# Patient Record
Sex: MALE | Race: WHITE | Employment: OTHER | URBAN - METROPOLITAN AREA
[De-identification: names, ages, dates, MRNs, and addresses within clinical notes are randomized per-mention and may not be internally consistent; named-entity substitution may affect disease eponyms.]

---

## 2017-02-27 ENCOUNTER — OFFICE VISIT (OUTPATIENT)
Dept: FAMILY MEDICINE CLINIC | Age: 82
End: 2017-02-27

## 2017-02-27 VITALS
OXYGEN SATURATION: 98 % | HEART RATE: 86 BPM | DIASTOLIC BLOOD PRESSURE: 96 MMHG | TEMPERATURE: 99.9 F | WEIGHT: 173 LBS | BODY MASS INDEX: 26.22 KG/M2 | RESPIRATION RATE: 18 BRPM | SYSTOLIC BLOOD PRESSURE: 158 MMHG | HEIGHT: 68 IN

## 2017-02-27 DIAGNOSIS — J40 BRONCHITIS: Primary | ICD-10-CM

## 2017-02-27 PROCEDURE — G8427 DOCREV CUR MEDS BY ELIG CLIN: HCPCS | Performed by: FAMILY MEDICINE

## 2017-02-27 PROCEDURE — 99213 OFFICE O/P EST LOW 20 MIN: CPT | Performed by: FAMILY MEDICINE

## 2017-02-27 PROCEDURE — 4040F PNEUMOC VAC/ADMIN/RCVD: CPT | Performed by: FAMILY MEDICINE

## 2017-02-27 PROCEDURE — G8484 FLU IMMUNIZE NO ADMIN: HCPCS | Performed by: FAMILY MEDICINE

## 2017-02-27 PROCEDURE — 1123F ACP DISCUSS/DSCN MKR DOCD: CPT | Performed by: FAMILY MEDICINE

## 2017-02-27 PROCEDURE — 1036F TOBACCO NON-USER: CPT | Performed by: FAMILY MEDICINE

## 2017-02-27 PROCEDURE — G8420 CALC BMI NORM PARAMETERS: HCPCS | Performed by: FAMILY MEDICINE

## 2017-02-27 RX ORDER — PROMETHAZINE HYDROCHLORIDE AND CODEINE PHOSPHATE 6.25; 1 MG/5ML; MG/5ML
5 SYRUP ORAL EVERY 4 HOURS PRN
Qty: 240 ML | Refills: 1 | Status: SHIPPED | OUTPATIENT
Start: 2017-02-27 | End: 2018-02-27

## 2017-02-27 RX ORDER — CEFDINIR 300 MG/1
300 CAPSULE ORAL 2 TIMES DAILY
Qty: 20 CAPSULE | Refills: 0 | Status: SHIPPED | OUTPATIENT
Start: 2017-02-27 | End: 2017-03-09

## 2017-02-27 ASSESSMENT — ENCOUNTER SYMPTOMS
GASTROINTESTINAL NEGATIVE: 1
EYES NEGATIVE: 1
COUGH: 1
SHORTNESS OF BREATH: 0
ALLERGIC/IMMUNOLOGIC NEGATIVE: 1

## 2017-03-23 RX ORDER — TAMSULOSIN HYDROCHLORIDE 0.4 MG/1
CAPSULE ORAL
Qty: 90 CAPSULE | Refills: 3 | Status: SHIPPED | OUTPATIENT
Start: 2017-03-23 | End: 2018-03-12 | Stop reason: SDUPTHER

## 2017-11-16 RX ORDER — FINASTERIDE 5 MG/1
5 TABLET, FILM COATED ORAL DAILY
Qty: 90 TABLET | Refills: 3 | Status: SHIPPED | OUTPATIENT
Start: 2017-11-16 | End: 2019-10-18 | Stop reason: ALTCHOICE

## 2018-03-12 RX ORDER — TAMSULOSIN HYDROCHLORIDE 0.4 MG/1
CAPSULE ORAL
Qty: 90 CAPSULE | Refills: 3 | Status: SHIPPED | OUTPATIENT
Start: 2018-03-12 | End: 2019-10-18 | Stop reason: ALTCHOICE

## 2018-11-23 RX ORDER — FINASTERIDE 5 MG/1
5 TABLET, FILM COATED ORAL DAILY
Qty: 90 TABLET | Refills: 3 | Status: SHIPPED | OUTPATIENT
Start: 2018-11-23 | End: 2019-10-18

## 2019-03-21 RX ORDER — TAMSULOSIN HYDROCHLORIDE 0.4 MG/1
0.4 CAPSULE ORAL DAILY
Qty: 90 CAPSULE | Refills: 3 | Status: SHIPPED | OUTPATIENT
Start: 2019-03-21 | End: 2019-10-18 | Stop reason: ALTCHOICE

## 2019-10-18 ENCOUNTER — OFFICE VISIT (OUTPATIENT)
Dept: FAMILY MEDICINE CLINIC | Age: 84
End: 2019-10-18
Payer: MEDICARE

## 2019-10-18 VITALS
DIASTOLIC BLOOD PRESSURE: 86 MMHG | BODY MASS INDEX: 27.4 KG/M2 | HEIGHT: 69 IN | RESPIRATION RATE: 14 BRPM | SYSTOLIC BLOOD PRESSURE: 136 MMHG | OXYGEN SATURATION: 97 % | WEIGHT: 185 LBS | TEMPERATURE: 97.6 F | HEART RATE: 64 BPM

## 2019-10-18 DIAGNOSIS — R39.12 BENIGN PROSTATIC HYPERPLASIA WITH WEAK URINARY STREAM: ICD-10-CM

## 2019-10-18 DIAGNOSIS — Z00.00 PHYSICAL EXAM, ANNUAL: ICD-10-CM

## 2019-10-18 DIAGNOSIS — Z00.00 PHYSICAL EXAM, ANNUAL: Primary | ICD-10-CM

## 2019-10-18 DIAGNOSIS — N40.1 BENIGN PROSTATIC HYPERPLASIA WITH WEAK URINARY STREAM: ICD-10-CM

## 2019-10-18 LAB
ALBUMIN SERPL-MCNC: 4.2 G/DL (ref 3.5–4.6)
ALP BLD-CCNC: 131 U/L (ref 35–104)
ALT SERPL-CCNC: 19 U/L (ref 0–41)
ANION GAP SERPL CALCULATED.3IONS-SCNC: 13 MEQ/L (ref 9–15)
AST SERPL-CCNC: 22 U/L (ref 0–40)
BASOPHILS ABSOLUTE: 0 K/UL (ref 0–0.2)
BASOPHILS RELATIVE PERCENT: 0.4 %
BILIRUB SERPL-MCNC: 0.5 MG/DL (ref 0.2–0.7)
BUN BLDV-MCNC: 12 MG/DL (ref 8–23)
CALCIUM SERPL-MCNC: 9.3 MG/DL (ref 8.5–9.9)
CHLORIDE BLD-SCNC: 99 MEQ/L (ref 95–107)
CHOLESTEROL, TOTAL: 171 MG/DL (ref 0–199)
CO2: 29 MEQ/L (ref 20–31)
CREAT SERPL-MCNC: 0.74 MG/DL (ref 0.7–1.2)
EOSINOPHILS ABSOLUTE: 1.1 K/UL (ref 0–0.7)
EOSINOPHILS RELATIVE PERCENT: 16.1 %
GFR AFRICAN AMERICAN: >60
GFR NON-AFRICAN AMERICAN: >60
GLOBULIN: 3.5 G/DL (ref 2.3–3.5)
GLUCOSE BLD-MCNC: 114 MG/DL (ref 70–99)
HCT VFR BLD CALC: 48.2 % (ref 42–52)
HDLC SERPL-MCNC: 50 MG/DL (ref 40–59)
HEMOGLOBIN: 15.3 G/DL (ref 14–18)
LDL CHOLESTEROL CALCULATED: 107 MG/DL (ref 0–129)
LYMPHOCYTES ABSOLUTE: 1.4 K/UL (ref 1–4.8)
LYMPHOCYTES RELATIVE PERCENT: 19.9 %
MCH RBC QN AUTO: 29.2 PG (ref 27–31.3)
MCHC RBC AUTO-ENTMCNC: 31.7 % (ref 33–37)
MCV RBC AUTO: 92.3 FL (ref 80–100)
MONOCYTES ABSOLUTE: 0.8 K/UL (ref 0.2–0.8)
MONOCYTES RELATIVE PERCENT: 11.1 %
NEUTROPHILS ABSOLUTE: 3.7 K/UL (ref 1.4–6.5)
NEUTROPHILS RELATIVE PERCENT: 52.5 %
PDW BLD-RTO: 14.8 % (ref 11.5–14.5)
PLATELET # BLD: 186 K/UL (ref 130–400)
POTASSIUM SERPL-SCNC: 4.7 MEQ/L (ref 3.4–4.9)
RBC # BLD: 5.22 M/UL (ref 4.7–6.1)
SODIUM BLD-SCNC: 141 MEQ/L (ref 135–144)
TOTAL PROTEIN: 7.7 G/DL (ref 6.3–8)
TRIGL SERPL-MCNC: 71 MG/DL (ref 0–150)
WBC # BLD: 7 K/UL (ref 4.8–10.8)

## 2019-10-18 PROCEDURE — G0009 ADMIN PNEUMOCOCCAL VACCINE: HCPCS | Performed by: FAMILY MEDICINE

## 2019-10-18 PROCEDURE — G8484 FLU IMMUNIZE NO ADMIN: HCPCS | Performed by: FAMILY MEDICINE

## 2019-10-18 PROCEDURE — G0438 PPPS, INITIAL VISIT: HCPCS | Performed by: FAMILY MEDICINE

## 2019-10-18 PROCEDURE — 90732 PPSV23 VACC 2 YRS+ SUBQ/IM: CPT | Performed by: FAMILY MEDICINE

## 2019-10-18 RX ORDER — TAMSULOSIN HYDROCHLORIDE 0.4 MG/1
0.4 CAPSULE ORAL DAILY
COMMUNITY
End: 2020-03-03

## 2019-10-18 RX ORDER — CALCIUM CARBONATE/VITAMIN D3 600 MG-10
TABLET ORAL
COMMUNITY

## 2019-10-18 RX ORDER — LATANOPROST 50 UG/ML
1 SOLUTION/ DROPS OPHTHALMIC NIGHTLY
COMMUNITY

## 2019-10-18 RX ORDER — HYDROCORTISONE ACETATE 0.5 %
CREAM (GRAM) TOPICAL
COMMUNITY
End: 2021-01-01

## 2019-10-18 RX ORDER — SPIRONOLACTONE 25 MG
TABLET ORAL
COMMUNITY

## 2019-10-18 RX ORDER — LORATADINE 10 MG/1
10 CAPSULE, LIQUID FILLED ORAL DAILY
COMMUNITY
End: 2021-01-01

## 2019-10-18 ASSESSMENT — ENCOUNTER SYMPTOMS
EYES NEGATIVE: 1
SHORTNESS OF BREATH: 0
ALLERGIC/IMMUNOLOGIC NEGATIVE: 1
RESPIRATORY NEGATIVE: 1
GASTROINTESTINAL NEGATIVE: 1

## 2019-10-18 ASSESSMENT — PATIENT HEALTH QUESTIONNAIRE - PHQ9
SUM OF ALL RESPONSES TO PHQ QUESTIONS 1-9: 0
1. LITTLE INTEREST OR PLEASURE IN DOING THINGS: 0
SUM OF ALL RESPONSES TO PHQ QUESTIONS 1-9: 0
2. FEELING DOWN, DEPRESSED OR HOPELESS: 0
SUM OF ALL RESPONSES TO PHQ9 QUESTIONS 1 & 2: 0

## 2019-11-18 RX ORDER — FINASTERIDE 5 MG/1
5 TABLET, FILM COATED ORAL DAILY
Qty: 90 TABLET | Refills: 3 | Status: SHIPPED | OUTPATIENT
Start: 2019-11-18 | End: 2020-01-01 | Stop reason: SDUPTHER

## 2020-01-01 ENCOUNTER — TELEPHONE (OUTPATIENT)
Dept: FAMILY MEDICINE CLINIC | Age: 85
End: 2020-01-01

## 2020-01-01 ENCOUNTER — VIRTUAL VISIT (OUTPATIENT)
Dept: FAMILY MEDICINE CLINIC | Age: 85
End: 2020-01-01
Payer: MEDICARE

## 2020-01-01 ENCOUNTER — HOSPITAL ENCOUNTER (INPATIENT)
Age: 85
LOS: 2 days | Discharge: HOME | DRG: 101 | End: 2020-12-03
Attending: STUDENT IN AN ORGANIZED HEALTH CARE EDUCATION/TRAINING PROGRAM | Admitting: FAMILY MEDICINE
Payer: MEDICARE

## 2020-01-01 ENCOUNTER — APPOINTMENT (OUTPATIENT)
Dept: CT IMAGING | Age: 85
DRG: 101 | End: 2020-01-01
Payer: MEDICARE

## 2020-01-01 VITALS
WEIGHT: 174.25 LBS | SYSTOLIC BLOOD PRESSURE: 129 MMHG | BODY MASS INDEX: 28 KG/M2 | TEMPERATURE: 97.7 F | RESPIRATION RATE: 18 BRPM | OXYGEN SATURATION: 99 % | DIASTOLIC BLOOD PRESSURE: 65 MMHG | HEIGHT: 66 IN | HEART RATE: 61 BPM

## 2020-01-01 LAB
ABO/RH: NORMAL
ALBUMIN SERPL-MCNC: 3.9 G/DL (ref 3.5–4.6)
ALP BLD-CCNC: 142 U/L (ref 35–104)
ALT SERPL-CCNC: 19 U/L (ref 0–41)
ANION GAP SERPL CALCULATED.3IONS-SCNC: 8 MEQ/L (ref 9–15)
ANION GAP SERPL CALCULATED.3IONS-SCNC: 9 MEQ/L (ref 9–15)
ANTIBODY SCREEN: NORMAL
APTT: 35.8 SEC (ref 24.4–36.8)
AST SERPL-CCNC: 20 U/L (ref 0–40)
BASOPHILS ABSOLUTE: 0 K/UL (ref 0–0.2)
BASOPHILS ABSOLUTE: 0 K/UL (ref 0–0.2)
BASOPHILS RELATIVE PERCENT: 0.3 %
BASOPHILS RELATIVE PERCENT: 0.4 %
BILIRUB SERPL-MCNC: 0.4 MG/DL (ref 0.2–0.7)
BILIRUBIN URINE: NEGATIVE
BLOOD, URINE: NEGATIVE
BUN BLDV-MCNC: 12 MG/DL (ref 8–23)
BUN BLDV-MCNC: 14 MG/DL (ref 8–23)
CALCIUM SERPL-MCNC: 9 MG/DL (ref 8.5–9.9)
CALCIUM SERPL-MCNC: 9.1 MG/DL (ref 8.5–9.9)
CHLORIDE BLD-SCNC: 103 MEQ/L (ref 95–107)
CHLORIDE BLD-SCNC: 98 MEQ/L (ref 95–107)
CHP ED QC CHECK: NORMAL
CLARITY: CLEAR
CO2: 27 MEQ/L (ref 20–31)
CO2: 29 MEQ/L (ref 20–31)
COLOR: YELLOW
CREAT SERPL-MCNC: 0.79 MG/DL (ref 0.7–1.2)
CREAT SERPL-MCNC: 0.79 MG/DL (ref 0.7–1.2)
EKG ATRIAL RATE: 61 BPM
EKG P AXIS: 26 DEGREES
EKG P-R INTERVAL: 250 MS
EKG Q-T INTERVAL: 372 MS
EKG QRS DURATION: 88 MS
EKG QTC CALCULATION (BAZETT): 374 MS
EKG R AXIS: 12 DEGREES
EKG T AXIS: 48 DEGREES
EKG VENTRICULAR RATE: 61 BPM
EOSINOPHILS ABSOLUTE: 1.1 K/UL (ref 0–0.7)
EOSINOPHILS ABSOLUTE: 1.3 K/UL (ref 0–0.7)
EOSINOPHILS RELATIVE PERCENT: 17.8 %
EOSINOPHILS RELATIVE PERCENT: 18.6 %
GFR AFRICAN AMERICAN: >60
GFR AFRICAN AMERICAN: >60
GFR NON-AFRICAN AMERICAN: >60
GFR NON-AFRICAN AMERICAN: >60
GLOBULIN: 2.9 G/DL (ref 2.3–3.5)
GLUCOSE BLD-MCNC: 160 MG/DL
GLUCOSE BLD-MCNC: 160 MG/DL (ref 60–115)
GLUCOSE BLD-MCNC: 173 MG/DL (ref 70–99)
GLUCOSE BLD-MCNC: 192 MG/DL (ref 70–99)
GLUCOSE URINE: NEGATIVE MG/DL
HCT VFR BLD CALC: 42.6 % (ref 42–52)
HCT VFR BLD CALC: 45.3 % (ref 42–52)
HEMOGLOBIN: 14.3 G/DL (ref 14–18)
HEMOGLOBIN: 14.8 G/DL (ref 14–18)
INR BLD: 1
KETONES, URINE: NEGATIVE MG/DL
LEUKOCYTE ESTERASE, URINE: NEGATIVE
LYMPHOCYTES ABSOLUTE: 1.1 K/UL (ref 1–4.8)
LYMPHOCYTES ABSOLUTE: 2 K/UL (ref 1–4.8)
LYMPHOCYTES RELATIVE PERCENT: 18.2 %
LYMPHOCYTES RELATIVE PERCENT: 27.1 %
MAGNESIUM: 2.3 MG/DL (ref 1.7–2.4)
MCH RBC QN AUTO: 29.7 PG (ref 27–31.3)
MCH RBC QN AUTO: 29.7 PG (ref 27–31.3)
MCHC RBC AUTO-ENTMCNC: 32.6 % (ref 33–37)
MCHC RBC AUTO-ENTMCNC: 33.7 % (ref 33–37)
MCV RBC AUTO: 88.3 FL (ref 80–100)
MCV RBC AUTO: 91.1 FL (ref 80–100)
MONOCYTES ABSOLUTE: 0.4 K/UL (ref 0.2–0.8)
MONOCYTES ABSOLUTE: 0.6 K/UL (ref 0.2–0.8)
MONOCYTES RELATIVE PERCENT: 6.7 %
MONOCYTES RELATIVE PERCENT: 7.8 %
NEUTROPHILS ABSOLUTE: 3.3 K/UL (ref 1.4–6.5)
NEUTROPHILS ABSOLUTE: 3.4 K/UL (ref 1.4–6.5)
NEUTROPHILS RELATIVE PERCENT: 46.9 %
NEUTROPHILS RELATIVE PERCENT: 56.2 %
NITRITE, URINE: NEGATIVE
PDW BLD-RTO: 14.1 % (ref 11.5–14.5)
PDW BLD-RTO: 14.4 % (ref 11.5–14.5)
PERFORMED ON: ABNORMAL
PH UA: 7 (ref 5–9)
PLATELET # BLD: 158 K/UL (ref 130–400)
PLATELET # BLD: 171 K/UL (ref 130–400)
POTASSIUM REFLEX MAGNESIUM: 4 MEQ/L (ref 3.4–4.9)
POTASSIUM SERPL-SCNC: 4.3 MEQ/L (ref 3.4–4.9)
PROTEIN UA: NEGATIVE MG/DL
PROTHROMBIN TIME: 13.3 SEC (ref 12.3–14.9)
RBC # BLD: 4.82 M/UL (ref 4.7–6.1)
RBC # BLD: 4.97 M/UL (ref 4.7–6.1)
SODIUM BLD-SCNC: 135 MEQ/L (ref 135–144)
SODIUM BLD-SCNC: 139 MEQ/L (ref 135–144)
SPECIFIC GRAVITY UA: 1.01 (ref 1–1.03)
TOTAL CK: 55 U/L (ref 0–190)
TOTAL PROTEIN: 6.8 G/DL (ref 6.3–8)
TROPONIN: <0.01 NG/ML (ref 0–0.01)
TSH REFLEX: 1.97 UIU/ML (ref 0.44–3.86)
URINE REFLEX TO CULTURE: NORMAL
UROBILINOGEN, URINE: 0.2 E.U./DL
WBC # BLD: 5.9 K/UL (ref 4.8–10.8)
WBC # BLD: 7.2 K/UL (ref 4.8–10.8)

## 2020-01-01 PROCEDURE — 97161 PT EVAL LOW COMPLEX 20 MIN: CPT

## 2020-01-01 PROCEDURE — 99233 SBSQ HOSP IP/OBS HIGH 50: CPT | Performed by: PSYCHIATRY & NEUROLOGY

## 2020-01-01 PROCEDURE — 81003 URINALYSIS AUTO W/O SCOPE: CPT

## 2020-01-01 PROCEDURE — 36415 COLL VENOUS BLD VENIPUNCTURE: CPT

## 2020-01-01 PROCEDURE — 99222 1ST HOSP IP/OBS MODERATE 55: CPT | Performed by: PSYCHIATRY & NEUROLOGY

## 2020-01-01 PROCEDURE — 86901 BLOOD TYPING SEROLOGIC RH(D): CPT

## 2020-01-01 PROCEDURE — 97165 OT EVAL LOW COMPLEX 30 MIN: CPT

## 2020-01-01 PROCEDURE — 99283 EMERGENCY DEPT VISIT LOW MDM: CPT

## 2020-01-01 PROCEDURE — 84484 ASSAY OF TROPONIN QUANT: CPT

## 2020-01-01 PROCEDURE — 2580000003 HC RX 258: Performed by: FAMILY MEDICINE

## 2020-01-01 PROCEDURE — 93005 ELECTROCARDIOGRAM TRACING: CPT | Performed by: STUDENT IN AN ORGANIZED HEALTH CARE EDUCATION/TRAINING PROGRAM

## 2020-01-01 PROCEDURE — 1111F DSCHRG MED/CURRENT MED MERGE: CPT | Performed by: NURSE PRACTITIONER

## 2020-01-01 PROCEDURE — 96365 THER/PROPH/DIAG IV INF INIT: CPT

## 2020-01-01 PROCEDURE — 6360000002 HC RX W HCPCS: Performed by: STUDENT IN AN ORGANIZED HEALTH CARE EDUCATION/TRAINING PROGRAM

## 2020-01-01 PROCEDURE — 85730 THROMBOPLASTIN TIME PARTIAL: CPT

## 2020-01-01 PROCEDURE — 1123F ACP DISCUSS/DSCN MKR DOCD: CPT | Performed by: NURSE PRACTITIONER

## 2020-01-01 PROCEDURE — 85025 COMPLETE CBC W/AUTO DIFF WBC: CPT

## 2020-01-01 PROCEDURE — 6370000000 HC RX 637 (ALT 250 FOR IP): Performed by: FAMILY MEDICINE

## 2020-01-01 PROCEDURE — 6360000002 HC RX W HCPCS: Performed by: FAMILY MEDICINE

## 2020-01-01 PROCEDURE — 70450 CT HEAD/BRAIN W/O DYE: CPT

## 2020-01-01 PROCEDURE — APPSS30 APP SPLIT SHARED TIME 16-30 MINUTES: Performed by: NURSE PRACTITIONER

## 2020-01-01 PROCEDURE — 95816 EEG AWAKE AND DROWSY: CPT | Performed by: PSYCHIATRY & NEUROLOGY

## 2020-01-01 PROCEDURE — 86900 BLOOD TYPING SEROLOGIC ABO: CPT

## 2020-01-01 PROCEDURE — 1210000000 HC MED SURG R&B

## 2020-01-01 PROCEDURE — 84443 ASSAY THYROID STIM HORMONE: CPT

## 2020-01-01 PROCEDURE — 2580000003 HC RX 258: Performed by: STUDENT IN AN ORGANIZED HEALTH CARE EDUCATION/TRAINING PROGRAM

## 2020-01-01 PROCEDURE — 85610 PROTHROMBIN TIME: CPT

## 2020-01-01 PROCEDURE — 83735 ASSAY OF MAGNESIUM: CPT

## 2020-01-01 PROCEDURE — 4040F PNEUMOC VAC/ADMIN/RCVD: CPT | Performed by: NURSE PRACTITIONER

## 2020-01-01 PROCEDURE — 86850 RBC ANTIBODY SCREEN: CPT

## 2020-01-01 PROCEDURE — 95816 EEG AWAKE AND DROWSY: CPT

## 2020-01-01 PROCEDURE — 99443 PR PHYS/QHP TELEPHONE EVALUATION 21-30 MIN: CPT | Performed by: NURSE PRACTITIONER

## 2020-01-01 PROCEDURE — 80053 COMPREHEN METABOLIC PANEL: CPT

## 2020-01-01 PROCEDURE — G0439 PPPS, SUBSEQ VISIT: HCPCS | Performed by: NURSE PRACTITIONER

## 2020-01-01 PROCEDURE — 82550 ASSAY OF CK (CPK): CPT

## 2020-01-01 PROCEDURE — 80048 BASIC METABOLIC PNL TOTAL CA: CPT

## 2020-01-01 PROCEDURE — G8484 FLU IMMUNIZE NO ADMIN: HCPCS | Performed by: NURSE PRACTITIONER

## 2020-01-01 PROCEDURE — 93010 ELECTROCARDIOGRAM REPORT: CPT | Performed by: INTERNAL MEDICINE

## 2020-01-01 RX ORDER — BRIMONIDINE TARTRATE, TIMOLOL MALEATE 2; 5 MG/ML; MG/ML
1 SOLUTION/ DROPS OPHTHALMIC EVERY 12 HOURS
Status: DISCONTINUED | OUTPATIENT
Start: 2020-01-01 | End: 2020-01-01 | Stop reason: CLARIF

## 2020-01-01 RX ORDER — PROMETHAZINE HYDROCHLORIDE 25 MG/1
12.5 TABLET ORAL EVERY 6 HOURS PRN
Status: DISCONTINUED | OUTPATIENT
Start: 2020-01-01 | End: 2020-01-01 | Stop reason: HOSPADM

## 2020-01-01 RX ORDER — LANOLIN ALCOHOL/MO/W.PET/CERES
6 CREAM (GRAM) TOPICAL NIGHTLY
Status: DISCONTINUED | OUTPATIENT
Start: 2020-01-01 | End: 2020-01-01 | Stop reason: HOSPADM

## 2020-01-01 RX ORDER — SODIUM CHLORIDE 0.9 % (FLUSH) 0.9 %
10 SYRINGE (ML) INJECTION EVERY 12 HOURS SCHEDULED
Status: DISCONTINUED | OUTPATIENT
Start: 2020-01-01 | End: 2020-01-01 | Stop reason: HOSPADM

## 2020-01-01 RX ORDER — POLYETHYLENE GLYCOL 3350 17 G/17G
17 POWDER, FOR SOLUTION ORAL DAILY PRN
Status: DISCONTINUED | OUTPATIENT
Start: 2020-01-01 | End: 2020-01-01 | Stop reason: HOSPADM

## 2020-01-01 RX ORDER — FINASTERIDE 5 MG/1
5 TABLET, FILM COATED ORAL DAILY
Status: DISCONTINUED | OUTPATIENT
Start: 2020-01-01 | End: 2020-01-01 | Stop reason: HOSPADM

## 2020-01-01 RX ORDER — CHOLECALCIFEROL (VITAMIN D3) 125 MCG
5 CAPSULE ORAL DAILY
COMMUNITY

## 2020-01-01 RX ORDER — LEVETIRACETAM 500 MG/1
1000 TABLET ORAL 2 TIMES DAILY
Status: DISCONTINUED | OUTPATIENT
Start: 2020-01-01 | End: 2020-01-01 | Stop reason: HOSPADM

## 2020-01-01 RX ORDER — LEVETIRACETAM 1000 MG/1
1000 TABLET ORAL 2 TIMES DAILY
Qty: 60 TABLET | Refills: 3 | Status: SHIPPED | OUTPATIENT
Start: 2020-01-01 | End: 2021-01-01 | Stop reason: SDUPTHER

## 2020-01-01 RX ORDER — ACETAMINOPHEN 325 MG/1
650 TABLET ORAL EVERY 6 HOURS PRN
Status: DISCONTINUED | OUTPATIENT
Start: 2020-01-01 | End: 2020-01-01 | Stop reason: HOSPADM

## 2020-01-01 RX ORDER — LATANOPROST 50 UG/ML
1 SOLUTION/ DROPS OPHTHALMIC NIGHTLY
Status: DISCONTINUED | OUTPATIENT
Start: 2020-01-01 | End: 2020-01-01 | Stop reason: HOSPADM

## 2020-01-01 RX ORDER — FINASTERIDE 5 MG/1
5 TABLET, FILM COATED ORAL DAILY
Qty: 90 TABLET | Refills: 3 | Status: SHIPPED | OUTPATIENT
Start: 2020-01-01

## 2020-01-01 RX ORDER — ONDANSETRON 2 MG/ML
4 INJECTION INTRAMUSCULAR; INTRAVENOUS EVERY 6 HOURS PRN
Status: DISCONTINUED | OUTPATIENT
Start: 2020-01-01 | End: 2020-01-01 | Stop reason: HOSPADM

## 2020-01-01 RX ORDER — SODIUM CHLORIDE 0.9 % (FLUSH) 0.9 %
10 SYRINGE (ML) INJECTION PRN
Status: DISCONTINUED | OUTPATIENT
Start: 2020-01-01 | End: 2020-01-01 | Stop reason: HOSPADM

## 2020-01-01 RX ORDER — LEVETIRACETAM 500 MG/1
500 TABLET ORAL 2 TIMES DAILY
Status: ON HOLD | COMMUNITY
Start: 2020-01-01 | End: 2020-01-01 | Stop reason: HOSPADM

## 2020-01-01 RX ORDER — ACETAMINOPHEN 650 MG/1
650 SUPPOSITORY RECTAL EVERY 6 HOURS PRN
Status: DISCONTINUED | OUTPATIENT
Start: 2020-01-01 | End: 2020-01-01 | Stop reason: HOSPADM

## 2020-01-01 RX ORDER — TIMOLOL MALEATE 5 MG/ML
1 SOLUTION/ DROPS OPHTHALMIC 2 TIMES DAILY
Status: DISCONTINUED | OUTPATIENT
Start: 2020-01-01 | End: 2020-01-01 | Stop reason: HOSPADM

## 2020-01-01 RX ORDER — TAMSULOSIN HYDROCHLORIDE 0.4 MG/1
0.4 CAPSULE ORAL DAILY
Status: DISCONTINUED | OUTPATIENT
Start: 2020-01-01 | End: 2020-01-01 | Stop reason: HOSPADM

## 2020-01-01 RX ORDER — BRIMONIDINE TARTRATE 2 MG/ML
1 SOLUTION/ DROPS OPHTHALMIC 2 TIMES DAILY
Status: DISCONTINUED | OUTPATIENT
Start: 2020-01-01 | End: 2020-01-01 | Stop reason: HOSPADM

## 2020-01-01 RX ADMIN — FINASTERIDE 5 MG: 5 TABLET, FILM COATED ORAL at 09:40

## 2020-01-01 RX ADMIN — Medication 6 MG: at 22:43

## 2020-01-01 RX ADMIN — LATANOPROST 1 DROP: 50 SOLUTION OPHTHALMIC at 23:20

## 2020-01-01 RX ADMIN — TIMOLOL MALEATE 1 DROP: 5 SOLUTION/ DROPS OPHTHALMIC at 14:35

## 2020-01-01 RX ADMIN — TAMSULOSIN HYDROCHLORIDE 0.4 MG: 0.4 CAPSULE ORAL at 08:54

## 2020-01-01 RX ADMIN — BRIMONIDINE TARTRATE 1 DROP: 2 SOLUTION OPHTHALMIC at 09:56

## 2020-01-01 RX ADMIN — Medication 6 MG: at 23:23

## 2020-01-01 RX ADMIN — LATANOPROST 1 DROP: 50 SOLUTION OPHTHALMIC at 22:43

## 2020-01-01 RX ADMIN — LEVETIRACETAM 1000 MG: 100 INJECTION, SOLUTION, CONCENTRATE INTRAVENOUS at 06:38

## 2020-01-01 RX ADMIN — Medication 10 ML: at 08:54

## 2020-01-01 RX ADMIN — BRIMONIDINE TARTRATE 1 DROP: 2 SOLUTION OPHTHALMIC at 22:43

## 2020-01-01 RX ADMIN — TAMSULOSIN HYDROCHLORIDE 0.4 MG: 0.4 CAPSULE ORAL at 09:40

## 2020-01-01 RX ADMIN — BRIMONIDINE TARTRATE 1 DROP: 2 SOLUTION OPHTHALMIC at 14:35

## 2020-01-01 RX ADMIN — FINASTERIDE 5 MG: 5 TABLET, FILM COATED ORAL at 08:54

## 2020-01-01 RX ADMIN — Medication 10 ML: at 23:23

## 2020-01-01 RX ADMIN — LEVETIRACETAM 1000 MG: 100 INJECTION, SOLUTION INTRAVENOUS at 18:32

## 2020-01-01 RX ADMIN — Medication 10 ML: at 22:45

## 2020-01-01 RX ADMIN — ENOXAPARIN SODIUM 40 MG: 40 INJECTION SUBCUTANEOUS at 23:23

## 2020-01-01 RX ADMIN — TIMOLOL MALEATE 1 DROP: 5 SOLUTION/ DROPS OPHTHALMIC at 23:21

## 2020-01-01 RX ADMIN — LEVETIRACETAM 1000 MG: 100 INJECTION, SOLUTION, CONCENTRATE INTRAVENOUS at 06:07

## 2020-01-01 RX ADMIN — BRIMONIDINE TARTRATE 1 DROP: 2 SOLUTION OPHTHALMIC at 23:22

## 2020-01-01 RX ADMIN — LEVETIRACETAM 1000 MG: 100 INJECTION, SOLUTION, CONCENTRATE INTRAVENOUS at 18:43

## 2020-01-01 RX ADMIN — TIMOLOL MALEATE 1 DROP: 5 SOLUTION/ DROPS OPHTHALMIC at 09:44

## 2020-01-01 RX ADMIN — Medication 10 ML: at 09:52

## 2020-01-01 ASSESSMENT — PAIN SCALES - GENERAL
PAINLEVEL_OUTOF10: 0

## 2020-01-01 ASSESSMENT — ENCOUNTER SYMPTOMS
VOMITING: 0
TROUBLE SWALLOWING: 0
SHORTNESS OF BREATH: 0
PHOTOPHOBIA: 0
SINUS PRESSURE: 0
GASTROINTESTINAL NEGATIVE: 1
EYES NEGATIVE: 1
WHEEZING: 0
NAUSEA: 0
CHOKING: 0
DIARRHEA: 0
COLOR CHANGE: 0
SHORTNESS OF BREATH: 0
CHEST TIGHTNESS: 0
SHORTNESS OF BREATH: 0
WHEEZING: 0
COUGH: 0
COUGH: 0
SHORTNESS OF BREATH: 0
NAUSEA: 0
COLOR CHANGE: 0
BACK PAIN: 0
VOMITING: 0
BACK PAIN: 0
COUGH: 0
NAUSEA: 0
TROUBLE SWALLOWING: 0
VOMITING: 0
TROUBLE SWALLOWING: 0
ABDOMINAL PAIN: 0
CHEST TIGHTNESS: 0

## 2020-01-01 ASSESSMENT — LIFESTYLE VARIABLES
HOW OFTEN DURING THE LAST YEAR HAVE YOU FOUND THAT YOU WERE NOT ABLE TO STOP DRINKING ONCE YOU HAD STARTED: 0
HOW OFTEN DO YOU HAVE A DRINK CONTAINING ALCOHOL: 0
HOW MANY STANDARD DRINKS CONTAINING ALCOHOL DO YOU HAVE ON A TYPICAL DAY: 0
AUDIT-C TOTAL SCORE: 4
HOW OFTEN DO YOU HAVE A DRINK CONTAINING ALCOHOL: 4
HOW OFTEN DURING THE LAST YEAR HAVE YOU HAD A FEELING OF GUILT OR REMORSE AFTER DRINKING: 0
HAVE YOU OR SOMEONE ELSE BEEN INJURED AS A RESULT OF YOUR DRINKING: 0
HAS A RELATIVE, FRIEND, DOCTOR, OR ANOTHER HEALTH PROFESSIONAL EXPRESSED CONCERN ABOUT YOUR DRINKING OR SUGGESTED YOU CUT DOWN: 0
AUDIT TOTAL SCORE: 4
HOW OFTEN DO YOU HAVE SIX OR MORE DRINKS ON ONE OCCASION: 0
HOW OFTEN DURING THE LAST YEAR HAVE YOU BEEN UNABLE TO REMEMBER WHAT HAPPENED THE NIGHT BEFORE BECAUSE YOU HAD BEEN DRINKING: 0
HOW OFTEN DURING THE LAST YEAR HAVE YOU FAILED TO DO WHAT WAS NORMALLY EXPECTED FROM YOU BECAUSE OF DRINKING: 0
HOW OFTEN DURING THE LAST YEAR HAVE YOU NEEDED AN ALCOHOLIC DRINK FIRST THING IN THE MORNING TO GET YOURSELF GOING AFTER A NIGHT OF HEAVY DRINKING: 0

## 2020-01-01 ASSESSMENT — PATIENT HEALTH QUESTIONNAIRE - PHQ9
1. LITTLE INTEREST OR PLEASURE IN DOING THINGS: 0
SUM OF ALL RESPONSES TO PHQ9 QUESTIONS 1 & 2: 0
SUM OF ALL RESPONSES TO PHQ QUESTIONS 1-9: 0
SUM OF ALL RESPONSES TO PHQ QUESTIONS 1-9: 0
2. FEELING DOWN, DEPRESSED OR HOPELESS: 0
SUM OF ALL RESPONSES TO PHQ QUESTIONS 1-9: 0

## 2020-03-03 RX ORDER — TAMSULOSIN HYDROCHLORIDE 0.4 MG/1
CAPSULE ORAL
Qty: 90 CAPSULE | Refills: 3 | Status: SHIPPED | OUTPATIENT
Start: 2020-03-03 | Stop reason: SDUPTHER

## 2020-11-16 PROBLEM — G93.89 BRAIN MASS: Status: ACTIVE | Noted: 2020-01-01

## 2020-11-16 PROBLEM — G45.9 TIA (TRANSIENT ISCHEMIC ATTACK): Status: ACTIVE | Noted: 2020-01-01

## 2020-11-16 NOTE — PROGRESS NOTES
Medicare Annual Wellness Visit  Are Name: Nikko Evans Date: 2020   MRN: [id number] Sex: Male   Age: 80 y.o. Ethnicity: Non-/Non    : 10/10/1930 Race: Catrachito Kennedy is here for Medicare AWV    Screenings for behavioral, psychosocial and functional/safety risks, and cognitive dysfunction are all negative except as indicated below. These results, as well as other patient data from the [address] form, are documented in Flowsheets linked to this Encounter. No Known Allergies      Prior to Visit Medications    Medication Sig Taking? Authorizing Provider   levETIRAcetam (KEPPRA) 500 MG tablet Take 500 mg by mouth 2 times daily  Historical Provider, MD   finasteride (PROSCAR) 5 MG tablet Take 1 tablet by mouth daily  Albino Gibson MD   tamsulosin (FLOMAX) 0.4 MG capsule TAKE 1 CAPSULE BY MOUTH DAILY  Albino Gibson MD   timolol (BETIMOL) 0.5 % ophthalmic solution 1 drop 2 times daily  Historical Provider, MD   BRIMONIDINE TARTRATE-TIMOLOL OP Apply to eye  Historical Provider, MD   latanoprost (XALATAN) 0.005 % ophthalmic solution 1 drop nightly  Historical Provider, MD   Multiple Vitamin (ONE-A-DAY MENS PO) Take by mouth  Historical Provider, MD   loratadine (CLARITIN) 10 MG capsule Take 10 mg by mouth daily  Historical Provider, MD   Lutein 20 MG CAPS Take by mouth  Historical Provider, MD   Omega 3 1200 MG CAPS Take by mouth  Historical Provider, MD   Glucosamine-Chondroit-Vit C-Mn (GLUCOSAMINE 1500 COMPLEX) CAPS Take by mouth  Historical Provider, MD       No past medical history on file. No past surgical history on file. No family history on file.     CareTeam (Including outside providers/suppliers regularly involved in providing care):   Patient Care Team:  Dalton Walton MD as PCP - General (Family Medicine)  Dalton Walton MD as PCP - [facility]    Wt Readings from Last 3 Encounters:   10/18/19 185 lb (83.9 kg)   17 173 lb (78.5 kg)   03/24/16 170 lb (77.1 kg)      No flowsheet data found. There is no height or weight on file to calculate BMI. Based upon direct observation of the patient, evaluation of cognition reveals recent and remote memory intact. Patient's complete Health Risk Assessment and screening values have been reviewed and are found in Flowsheets. The following problems were reviewed today and where indicated follow up appointments were made and/or referrals ordered. Positive Risk Factor Screenings with Interventions:       General Health and ACP:  General  In the past 7 days, have you experienced any of the following? New or Increased Pain, New or Increased Fatigue, Loneliness, Social Isolation, Stress or Anger?: None of These  Do you get the social and emotional support that you need?: Yes  Do you have a Living Will?: Yes  Advance Directives     Power of  Living Will ACP-Advance Directive ACP-Power of     Not on File Not on File [address] [facility] Risk Interventions:  · No Living Will: ACP documents already completed- patient asked to provide copy to the office    Health Habits/Nutrition:  Health Habits/Nutrition  Do you exercise for at least 20 minutes 2-3 times per week?: Yes  Have you lost any weight without trying in the past 3 months?: No  Do you eat fewer than 2 meals per day?: No  Have you seen a dentist within the past year?: (!) No     Health Habits/Nutrition Interventions:  · Dental exam overdue:  patient declines dental evaluation    ADL:  ADLs  In the past 7 days, did you need help from others to perform any of the following everyday activities? Eating, dressing, grooming, bathing, toileting, or walking/balance?: None  In the past 7 days, did you need help from others to take care of any of the following?  Laundry, housekeeping, banking/finances, shopping, telephone use, food preparation, transportation, or taking medications?: [employer]  ADL Interventions:  · Patient declines any further evaluation/treatment for this issue  · pt wife provides assistance as needed    Personalized Preventive Plan   Current Health Maintenance Status  Immunization History   Administered Date(s) Administered    Influenza Vaccine, unspecified formulation 10/04/2012, 10/10/2013, 09/24/2015    Influenza Whole 09/20/2015    Influenza, High Dose (Fluzone 65 yrs and older) 09/21/2017, 09/10/2018    Influenza, Quadv, adjuvanted, 65 yrs +, IM, PF (Fluad) 09/10/2020    Influenza, Triv, inactivated, subunit, adjuvanted, IM (Fluad 65 yrs and older) 09/09/2019    Pneumococcal Conjugate 13-valent ([id number]) 07/21/2015    Pneumococcal Polysaccharide ([id number]) 10/18/2019    Zoster Live (Zostavax) 05/29/2012        Health Maintenance   Topic Date Due    DTaP/Tdap/Td vaccine (1 - Tdap) 10/10/1949    Shingles Vaccine (2 of 3) 07/24/2012    Annual Wellness Visit (AWV)  06/20/2019    Flu vaccine  Completed    Pneumococcal 65+ years Vaccine  Completed    Hepatitis A vaccine  Aged Out    Hepatitis B vaccine  Aged Out    Hib vaccine  Aged Out    Meningococcal (ACWY) vaccine  Aged Out     Recommendations for [employer] Due: see orders and patient instructions/AVS.  . Recommended screening schedule for the next 5-10 years is provided to the patient in written form: see Patient Instructions/AVS.    Cindy Madrid was seen today for medicare awv. Diagnoses and all orders for this visit:    Routine general medical examination at a health care facility              Saul Marx is a 80 y.o. male being evaluated by a Virtual Visit (phone) encounter to address concerns as mentioned above. A caregiver was present when appropriate. Due to this being a TeleHealth encounter (During QQI-10 public health emergency), evaluation of the following organ systems was limited: Vitals/Constitutional/EENT/Resp/CV/GI//MS/Neuro/Skin/Heme-Lymph-Imm.   Pursuant to the emergency declaration under the [address] [place] [address], [address] [place] [address] authority and the [employer] Act, this Virtual Visit was conducted with patient's (and/or legal guardian's) consent, to reduce the patient's risk of exposure to COVID-19 and provide necessary medical care. The patient (and/or legal guardian) has also been advised to contact this office for worsening conditions or problems, and seek emergency medical treatment and/or call 911 if deemed necessary. Patient identification was verified at the start of the visit: Yes    Services were provided through phone to substitute for in-person clinic visit. Patient and provider were located at their individual homes. --MATHEUS Savage - CNP on 11/16/2020 at 11:05 AM    An electronic signature was used to authenticate this note.

## 2020-11-16 NOTE — PROGRESS NOTES
Maya Vaughan is a 80 y.o. male evaluated via telephone on 11/16/2020. Consent:  He and/or health care decision maker is aware that that he may receive a bill for this telephone service, depending on his insurance coverage, and has provided verbal consent to proceed: Yes        This visit was a telephone encounter. Patient was located at their home. Provider was located at their _x__ home or        ____ office. I affirm this is a Patient Initiated Episode with an Established Patient who has not had a related appointment within my department in the past 7 days or scheduled within the next 24 hours. Total Time: minutes: 21-30 minutes    Note: not billable if this call serves to triage the patient into an appointment for the relevant concern      [address] [place] [address] or Hospital Follow Up      Maya Vaughan   YOB: 1930    Date of Office Visit:  11/16/2020  Date of Hospital Admission: 11/9/2020  Date of Hospital Discharge: 11/12/2020    Care management risk score Rising risk (score 2-5) and Complex Care (Scores >=6): 0     Non face to face  following discharge, date last encounter closed (first attempt may have been earlier): *No documented post hospital discharge outreach found in the last 14 days     Call initiated 2 business days of discharge: *No response recorded in the last 14 days    Patient Active Problem List   Diagnosis    BPH (benign prostatic hyperplasia)    Brain mass    TIA (transient ischemic attack)       No Known Allergies    Medications listed as ordered at the time of discharge from hospital  Keppr 500mg twice daily     Medications marked \"taking\" at this time  No outpatient medications have been marked as taking for the 11/16/20 encounter (Virtual Visit) with MATHEUS Hobson CNP.         Medications patient taking as of now reconciled against medications ordered at time of hospital discharge: Yes    Chief

## 2020-11-16 NOTE — PATIENT INSTRUCTIONS
Personalized Preventive Plan for Karla Rondon - 11/16/2020  Medicare offers a range of preventive health benefits. Some of the tests and screenings are paid in full while other may be subject to a deductible, co-insurance, and/or copay. Some of these benefits include a comprehensive review of your medical history including lifestyle, illnesses that may run in your family, and various assessments and screenings as appropriate. After reviewing your medical record and screening and assessments performed today your provider may have ordered immunizations, labs, imaging, and/or referrals for you. A list of these orders (if applicable) as well as your Preventive Care list are included within your After Visit Summary for your review. Other Preventive Recommendations:    · A preventive eye exam performed by an eye specialist is recommended every 1-2 years to screen for glaucoma; cataracts, macular degeneration, and other eye disorders. · A preventive dental visit is recommended every 6 months. · Try to get at least 150 minutes of exercise per week or 10,000 steps per day on a pedometer . · Order or download the FREE \"Exercise & Physical Activity: Your Everyday Guide\" from [employer]. Call 5-219.659.6986 or search [employer] online. · You need 1011-3300 mg of calcium and 8546-5416 IU of vitamin D per day. It is possible to meet your calcium requirement with diet alone, but a vitamin D supplement is usually necessary to meet this goal.  · When exposed to the sun, use a sunscreen that protects against both UVA and UVB radiation with an SPF of 30 or greater. Reapply every 2 to 3 hours or after sweating, drying off with a towel, or swimming. · Always wear a seat belt when traveling in a car. Always wear a helmet when riding a bicycle or motorcycle.

## 2020-12-01 NOTE — ED NOTES
Patient presents to the ER with left sided facial droop that started today at 1600  Pt was just admitted at [facility] for the same a few weeks ago   Patient denies weakness in extremities  All extremities strong and equal   No slurred speech  A&Ox4  Denies pain        Aramis Mccormick RN  12/01/20 4805

## 2020-12-02 NOTE — PROGRESS NOTES
[facility] OCCUPATIONAL THERAPY EVALUATION - ACUTE     NAME: Serafin Henning  : 10/10/1930 (80 y.o.)  MRN: [id number]  CODE STATUS: Full Code  [address]    Date of Service: 2020    Patient Diagnosis(es): Brain mass [G93.89]   Chief Complaint   Patient presents with    Facial Droop     since 4 pm     Patient Active Problem List    Diagnosis Date Noted    Brain mass 2020    TIA (transient ischemic attack) 2020    BPH (benign prostatic hyperplasia) 2013        Past Medical History:   Diagnosis Date    Cerebral artery occlusion with cerebral infarction [facility])      Past Surgical History:   Procedure Laterality Date    COLONOSCOPY      EYE SURGERY          Restrictions  Restrictions/Precautions: Fall Risk(moderate fall risk per Kathern Bellow score)     Safety Devices: Safety Devices  Safety Devices in place: Not Applicable        Subjective  Pre Treatment Pain Screening  Pain at present: 0  Scale Used: Numeric Score  Intervention List: Patient able to continue with treatment, Patient declined any intervention    Pain Reassessment:   Pain Assessment  Patient Currently in Pain: No  Pain Assessment: 0-10  Pain Level: 0       Prior Level of Function:  Social/Functional History  Lives With: Spouse  Type of Home: House  Home Layout: One level  Home Access: Level entry  Bathroom Shower/Tub: Tub/Shower unit  Bathroom Equipment: Grab bars in shower, Shower chair  Home Equipment: (none)  ADL Assistance: Independent  Homemaking Assistance: Independent  Ambulation Assistance: Independent(no AD)  Transfer Assistance: Independent  Active : No  Patient's  Info: wife does most of driving    OBJECTIVE:     Orientation Status:  Orientation  Overall Orientation Status: Within Functional Limits    Observation:  Observation/Palpation  Posture: Good  Observation: pleasant, cooperative, no acute distress, face symetrical    Cognition Status:  Cognition  Overall Cognitive Status: WNL    Perception Independent  Functional Mobility Comments: Household distance in hallway. No LOB or difficulty  Transfers  Sit to stand: Independent  Stand to sit: Independent    Bed Mobility  Bed mobility  Supine to Sit: Modified independent    Seated and Standing Balance:  Balance  Sitting Balance: Independent  Standing Balance: Independent    Functional Endurance:  Activity Tolerance  Activity Tolerance: Patient Tolerated treatment well    D/C Recommendations:  OT D/C RECOMMENDATIONS  REQUIRES OT FOLLOW UP: No    Equipment Recommendations:  OT Equipment Recommendations  Equipment Needed: No    OT Education:   OT Education  OT Education: OT Role, Plan of Care  Patient Education: Educated pt. on role of acute care OT  Barriers to Learning: None    OT Follow Up:  OT D/C RECOMMENDATIONS  REQUIRES OT FOLLOW UP: No       Assessment/Discharge Disposition:  Assessment: Pt. is a 80year old man from home with spouse who presents to [id number] [place] [address] with L side weakness. Pt. demonstrates no significant deficits or LOB. No acute OT indicated.   Prognosis: Good  No Skilled OT: Independent with functional mobility, Independent with ADL's, Safe to return home  Decision Making: Low Complexity  History: Pt's medical history is moderately complex  Exam: Pt. has no performance deficits  Assistance / Modification: Pt. requires no assist    Six Click Score   How much help for putting on and taking off regular lower body clothing?: None  How much help for Bathing?: None  How much help for Toileting?: None  How much help for putting on and taking off regular upper body clothing?: None  How much help for taking care of personal grooming?: None  How much help for eating meals?: None  AM-[facility] Inpatient Daily Activity Raw Score: 24  AM-PAC Inpatient ADL T-Scale Score : 57.54  ADL Inpatient CMS 0-100% Score: 0    Plan:  Plan  Times per week: None    Goals:  Patient Goal: Patient goals : \"I want to get home\"     Discussed and agreed upon: Yes Comments:     Therapy Time:   OT Individual Minutes  Time In: 0938  Time Out: 3327  Minutes: 10    Eval: 10 minutes     Electronically signed by:     AYUSH Sawyer  12/2/2020, 11:51 AM

## 2020-12-02 NOTE — PROGRESS NOTES
Physician Progress Note    2020   11:27 AM    Name:  Jimmie Cedeño  MRN:    [id number]      Day: 1     Admit Date: 2020  5:26 PM  PCP: Josselyn Haas MD    Code Status:  Full Code      Assessment and Plan: Active Problems/ diagnosis:     Focal partial seizure-facial twitching  Brain mass-diagnosed last month at [facility]    Plan    -Resume Keppra as per neurology. Discussed with Dr. Ivania Campa, neurologist.  -Plan to discharge home if remains stable by tomorrow. DVT PPx     Subjective:      no new events. Symptoms are controlled. No new symptoms. Physical Examination:      Vitals:  BP (!) 153/70   Pulse 64   Temp 97.9 °F (36.6 °C) (Oral)   Resp 16   Ht 5' 6\" (1.676 m)   Wt 174 lb 4 oz (79 kg)   SpO2 98%   BMI 28.12 kg/m²   Temp (24hrs), Av.8 °F (36.6 °C), Min:97.7 °F (36.5 °C), Max:98 °F (36.7 °C)      General appearance: alert, cooperative and no distress  Mental Status: oriented to person, place and time and normal affect  Lungs: clear to auscultation bilaterally, normal effort  Heart: regular rate and rhythm, no murmur  Abdomen: soft, nontender, nondistended, bowel sounds present, no masses  Extremities: no edema, redness, tenderness in the calves  Skin: no gross lesions, rashes  Neurologically: Alert and to x3, strength is intact throughout, sensation is intact throughout. No facial twitching noted at this time.     Data:     Labs:  Recent Labs     20  17420  1038   WBC 7.2 5.9   HGB 14.8 14.3    158     Recent Labs     20  1748 20  1038     --  139   K 4.3  --  4.0   CL 98  --  103   CO2 29  --  27   BUN 14  --  12   CREATININE 0.79  --  0.79   GLUCOSE 173* 160 192*     Recent Labs     20   AST 20   ALT 19   BILITOT 0.4   ALKPHOS 142*       Current Facility-Administered Medications   Medication Dose Route Frequency Provider Last Rate Last Dose    sodium chloride flush 0.9 % injection 10 mL  10 mL Intravenous 2 times per day Latosha Hoyt MD   10 mL at 12/02/20 0854    sodium chloride flush 0.9 % injection 10 mL  10 mL Intravenous PRN Latosha Hoyt MD        acetaminophen (TYLENOL) tablet 650 mg  650 mg Oral Q6H PRN Latosha Hoyt MD        Or    acetaminophen (TYLENOL) suppository 650 mg  650 mg Rectal Q6H PRN aLtosha Hoyt MD        polyethylene glycol [facility]) packet 17 g  17 g Oral Daily PRN Latosha Hoyt MD        promethazine [facility]) tablet 12.5 mg  12.5 mg Oral Q6H PRN Latosha Hoyt MD        Or    [facility]ron [facility]) injection 4 mg  4 mg Intravenous Q6H PRN Latosha Hoyt MD        enoxaparin (LOVENOX) injection 40 mg  40 mg Subcutaneous Daily Latosha Hoyt MD        levETIRAcetam (KEPPRA) 1,000 mg in sodium chloride 0.9 % 100 mL IVPB  1,000 mg Intravenous Q12H Latosha Hoyt MD   Stopped at 12/02/20 0622    finasteride (PROSCAR) tablet 5 mg  5 mg Oral Daily Latosha Hoyt MD   5 mg at 12/02/20 0854    latanoprost (XALATAN) 0.005 % ophthalmic solution 1 drop  1 drop Both Eyes Nightly Latosha Hoyt MD   1 drop at 12/01/20 2243    melatonin tablet 6 mg  6 mg Oral Nightly Latosha Hoyt MD   6 mg at 12/01/20 2243    tamsulosin (FLOMAX) capsule 0.4 mg  0.4 mg Oral Daily Latosha Hoyt MD   0.4 mg at 12/02/20 0854    brimonidine (ALPHAGAN) 0.2 % ophthalmic solution 1 drop  1 drop Both Eyes BID Latosha Hoyt MD   1 drop at 12/01/20 2243    timolol (TIMOPTIC) 0.5 % ophthalmic solution 1 drop  1 drop Both Eyes BID Latosha Hoyt MD           Additional work up or/and treatment plan may be added today or then after based on clinical progression. I am managing a portion of pt care. Some medical issues are handled by other specialists. Additional work up and treatment should be done in out pt setting by pt PCP and other out pt providers.       In addition to examining and evaluating pt, I spent additional time explaining care, normaland abnormal findings, and treatment plan. All of pt questions were answered. Counseling, diet and education were provided. Case will be discussed with nursing staff when appropriate. Family will be updated if and when appropriate.        Electronically signed by Lucila Couch DO on 12/2/2020 at 11:27 AM

## 2020-12-02 NOTE — FLOWSHEET NOTE
Admission complete. Patient denies cp, sob, n/v. Patient A&Ox4,VSS. Neuro WNL. LS clear, even and unlabored. BS active in all 4 quadrants. Patient has equal bilateral strength. Patient has left facial droop d/t stroke 2 weeks ago. Patient has no c/o pain at this time. Bed alarm on. Call light within reach. Will continue to monitor.

## 2020-12-02 NOTE — CONSULTS
Subjective:      Patient ID: Katie Leger is a 80 y.o. male who presents today for seizures. Patient was admitted through the emergency room. HPI 80-year-old right-hand gentleman who started to have focal partial seizures with clearing of his lips. This is unilateral as patient has a Bell's palsy on the left. We were contacted from the emergency room as he was almost in a status and we had increased his Keppra to 1000 g twice a day and he has responded to this. He does have some minor side effects of sedation though appears to be doing otherwise well without any cognitive issues. He was recently admitted [facility] and initially he was diagnosed with a stroke he had a complete cardiovascular and cerebrovascular work-up which are reviewed. Patient then had an MRI of the brain with contrast which shows a small lesion in the precentral gyrus on the right suspicious for either a lymphoma or metastasis. Residual stroke is still a possibility. Patient reports no headache and he appears to be actually quite aware of his diagnosis. He has a left facial paralysis and he does not know when this occurred he reports he always has mild on the right. Review of Systems   Constitutional: Negative for fever. HENT: Negative for ear pain, tinnitus and trouble swallowing. Eyes: Negative for photophobia and visual disturbance. Respiratory: Negative for choking and shortness of breath. Cardiovascular: Negative for chest pain and palpitations. Gastrointestinal: Negative for nausea and vomiting. Musculoskeletal: Negative for back pain, gait problem, joint swelling, myalgias, neck pain and neck stiffness. Skin: Negative for color change. Allergic/Immunologic: Negative for food allergies. Neurological: Negative for dizziness, tremors, seizures, syncope, facial asymmetry, speech difficulty, weakness, light-headedness, numbness and headaches.    Psychiatric/Behavioral: Negative for behavioral problems, confusion, hallucinations and sleep disturbance. Past Medical History:   Diagnosis Date    Cerebral artery occlusion with cerebral infarction [facility])      Past Surgical History:   Procedure Laterality Date    COLONOSCOPY      EYE SURGERY       Social History     Socioeconomic History    Marital status:      Spouse name: Not on file    Number of children: Not on file    Years of education: Not on file    Highest education level: Not on file   Occupational History    Not on file   Social Needs    Financial resource strain: Not on file    Food insecurity     Worry: Not on file     Inability: Not on file    Transportation needs     Medical: Not on file     Non-medical: Not on file   Tobacco Use    Smoking status: Never Smoker    Smokeless tobacco: Never Used   Substance and Sexual Activity    Alcohol use: Yes     Alcohol/week: 1.0 standard drinks     Types: 1 Glasses of wine per week    Drug use: No    Sexual activity: Yes   Lifestyle    Physical activity     Days per week: Not on file     Minutes per session: Not on file    Stress: Not on file   Relationships    Social connections     Talks on phone: Not on file     Gets together: Not on file     Attends Worship service: Not on file     Active member of club or organization: Not on file     Attends meetings of clubs or organizations: Not on file     Relationship status: Not on file    Intimate partner violence     Fear of current or ex partner: Not on file     Emotionally abused: Not on file     Physically abused: Not on file     Forced sexual activity: Not on file   Other Topics Concern    Not on file   Social History Narrative    Not on file     History reviewed. No pertinent family history.   No Known Allergies  Current Facility-Administered Medications   Medication Dose Route Frequency Provider Last Rate Last Dose    sodium chloride flush 0.9 % injection 10 mL  10 mL Intravenous 2 times per day Carlyle Eugene MD   10 mL at 12/02/20 0854    sodium chloride flush 0.9 % injection 10 mL  10 mL Intravenous PRN Radha Ortiz MD        acetaminophen (TYLENOL) tablet 650 mg  650 mg Oral Q6H PRN Radha Ortiz MD        Or    acetaminophen (TYLENOL) suppository 650 mg  650 mg Rectal Q6H PRN Radha Ortiz MD        polyethylene glycol [facility]) packet 17 g  17 g Oral Daily PRN Radha Ortiz MD        promethazine [facility]) tablet 12.5 mg  12.5 mg Oral Q6H PRN Radha Ortiz MD        Or    ondansetron [facility]) injection 4 mg  4 mg Intravenous Q6H PRN Radha Ortiz MD        enoxaparin (LOVENOX) injection 40 mg  40 mg Subcutaneous Daily Radha Ortiz MD        levETIRAcetam (KEPPRA) 1,000 mg in sodium chloride 0.9 % 100 mL IVPB  1,000 mg Intravenous Q12H Radha Ortiz MD   Stopped at 12/02/20 0622    finasteride (PROSCAR) tablet 5 mg  5 mg Oral Daily Radha Ortiz MD   5 mg at 12/02/20 0854    latanoprost (XALATAN) 0.005 % ophthalmic solution 1 drop  1 drop Both Eyes Nightly Radha Ortiz MD   1 drop at 12/01/20 2243    melatonin tablet 6 mg  6 mg Oral Nightly Radha Ortiz MD   6 mg at 12/01/20 2243    tamsulosin (FLOMAX) capsule 0.4 mg  0.4 mg Oral Daily Radha Ortiz MD   0.4 mg at 12/02/20 0854    brimonidine (ALPHAGAN) 0.2 % ophthalmic solution 1 drop  1 drop Both Eyes BID Radha Ortiz MD   1 drop at 12/01/20 2243    timolol (TIMOPTIC) 0.5 % ophthalmic solution 1 drop  1 drop Both Eyes BID Radha Ortiz MD            Objective:   BP (!) 153/70   Pulse 64   Temp 97.9 °F (36.6 °C) (Oral)   Resp 16   Ht 5' 6\" (1.676 m)   Wt 174 lb 4 oz (79 kg)   SpO2 98%   BMI 28.12 kg/m²     Physical Exam  Vitals signs reviewed. Eyes:      Pupils: Pupils are equal, round, and reactive to light. Neck:      Musculoskeletal: Normal range of motion. Cardiovascular:      Rate and Rhythm: Normal rate and regular rhythm. Heart sounds: No murmur.    Pulmonary:      Effort: Pulmonary effort is normal.      Breath sounds: Normal breath sounds. Abdominal:      General: Bowel sounds are normal.   Musculoskeletal: Normal range of motion. Skin:     General: Skin is warm. Neurological:      Mental Status: He is alert and oriented to person, place, and time. Cranial Nerves: Cranial nerve deficit present. Sensory: No sensory deficit. Motor: No abnormal muscle tone. Coordination: Coordination normal.      Deep Tendon Reflexes: Reflexes are normal and symmetric. Babinski sign absent on the right side. Babinski sign absent on the left side. Psychiatric:         Mood and Affect: Mood normal.     Left lower motor neuron type of facial palsy is notable. Ct Head Wo Contrast    Result Date: 12/1/2020  CT HEAD WO CONTRAST CLINICAL HISTORY:  History of brain tumor seen on MRI within the last 2 weeks. Diagnosed with seizures. Facial droop on the left and left cheek twitching, seen on prior evaluation MRI report CARE EVERYWHERE Philip 58: \"areas of enhancement in the right pre and postcentral gyri could reflect subacute infarcts, the appearance on the CT and nonenhanced MRI is atypical for a subacute infarct and suggestive of an underlying neoplastic process such as lymphoma or an intracranial metastasis. \" Please see that report for full details COMPARISON: None TECHNIQUE: Multiple unenhanced serial axial images of the brain from the vertex of the skull to the base of the skull were performed. FINDINGS: The ventricles are dilated. This is compensatory to the surrounding moderate generalized parenchymal volume loss. No mass. No midline shift. The cisterns are patent. There are white matter and periventricular changes most likely consistent with chronic small vessel disease. Question mass within the region of the right precentral gyrus, image 23 series 4 measuring approximately 1.6 x 1.6 cm. No acute  extra-axial findings.  The visualized osseous structures are unremarkable. There is a retention cyst or polyp measuring 9 mm within the left maxillary sinus. The mastoids are well aerated. THERE IS A MASS SEEN WITHIN THE RIGHT PRECENTRAL GYRUS AS DESCRIBED ABOVE PLEASE SEE MRI REPORT CARE EVERYWHERE [address] [place] [address] FOR ADDITIONAL DETAILS. IF SIGNS OR SYMPTOMS PERSIST THEN CONSIDER REPEAT MRI TO FURTHER EVALUATE. All CT scans at this facility use dose modulation, iterative reconstruction, and/or weight based dosing when appropriate to reduce radiation dose to as low as reasonably achievable. Lab Results   Component Value Date    WBC 7.2 12/01/2020    RBC 4.97 12/01/2020    HGB 14.8 12/01/2020    HCT 45.3 12/01/2020    MCV 91.1 12/01/2020    MCH 29.7 12/01/2020    MCHC 32.6 12/01/2020    RDW 14.4 12/01/2020     12/01/2020     Lab Results   Component Value Date     12/01/2020    K 4.3 12/01/2020    CL 98 12/01/2020    CO2 29 12/01/2020    BUN 14 12/01/2020    CREATININE 0.79 12/01/2020    GFRAA >60.0 12/01/2020    LABGLOM >60.0 12/01/2020    GLUCOSE 160 12/01/2020    PROT 6.8 12/01/2020    LABALBU 3.9 12/01/2020    CALCIUM 9.0 12/01/2020    BILITOT 0.4 12/01/2020    ALKPHOS 142 12/01/2020    AST 20 12/01/2020    ALT 19 12/01/2020     Lab Results   Component Value Date    PROTIME 13.3 12/01/2020    INR 1.0 12/01/2020     No results found for: TSH, [id number], FOLATE, FERRITIN, IRON, TIBC, PTRFSAT, TSH, FREET4  Lab Results   Component Value Date    TRIG 71 10/18/2019    HDL 50 10/18/2019    LDLCALC 107 10/18/2019     No results found for: [place], LABBENZ, CANNAB, COCAINESCRN, LABMETH, OPIATESCREENURINE, PHENCYCLIDINESCREENURINE, PPXUR, ETOH  No results found for: LITHIUM, DILFRTOT, VALPROATE    Assessment:   Focal partial seizures with probable status epilepticus given the timeframe of events though he did not generalize.   Patient had clearing of his face though this is unilateral on the right as he has

## 2020-12-02 NOTE — ACP (ADVANCE CARE PLANNING)
Advance Care Planning     Advance Care Planning Activator (Inpatient)  Conversation Note      Date of ACP Conversation: 12/1/2020    Conversation Conducted with: Patient with Decision Making Capacity    ACP Activator: [id number] makes decisions on behalf of the incapacitated patient: Decision Maker is asked to consider and make decisions based on patient values, known preferences, or best interests. Health Care Decision Maker:     Current Designated Health Care Decision Maker:    Primary Decision Maker: Brenna Wallis - [phone]     (If there is a [address] named in the [facility] Makers\" box in the ACP activity, but it is not visible above, be sure to open that field and then select the health care decision maker relationship (ie \"primary\") in the blank space to the right of the name.) Validate  this information as still accurate & up-to-date; edit [place] field as needed.)    Note: Assess and validate information in current ACP documents, as indicated. Note: If the relationship of these Decision-Makers to the patient does NOT follow your state's Next of Kin hierarchy, recommend that patient complete ACP document that meets state-specific requirements to allow them to act on the patient's behalf when appropriate. Care Preferences    Ventilation: \"If you were in your present state of health and suddenly became very ill and were unable to breathe on your own, what would your preference be about the use of a ventilator (breathing machine) if it were available to you? \"      Would the patient desire the use of ventilator (breathing machine)?: yes    \"If your health worsens and it becomes clear that your chance of recovery is unlikely, what would your preference be about the use of a ventilator (breathing machine) if it were available to you? \"     Would the patient desire the use of ventilator (breathing machine)?: visits.

## 2020-12-02 NOTE — ED PROVIDER NOTES
[address] [facility] ED  eMERGENCY dEPARTMENT eNCOUnter      Pt Name: Karla Rondon  MRN: [id number]  [place] 10/10/1930  Date of evaluation: 12/1/2020  Provider: Ava Arreguin, [address] [place] [address]       Chief Complaint   Patient presents with    Facial Droop     since 4 pm         HISTORY OF PRESENT ILLNESS   (Location/Symptom, Timing/Onset,Context/Setting, Quality, Duration, Modifying Factors, Severity)  Note limiting factors. Karla Rondon is a 80 y.o. male who presents to the emergency department with complaint of left facial droop and left face twitching. Patient states he had a recent stroke and was admitted to [facility]. He states for the exact same symptoms. Records review from care everywhere show that the patient initially started off for a stroke work-up however MRI and other imaging studies have demonstrated a brain tumor. Patient is supposed to see a neurosurgeon 12/10/2020. On reevaluation the ER physician discussed the data seen in care everywhere with the patient. He admits that he does indeed have a brain tumor. When asked if he is taking Keppra he states he takes 500 mg 2 times a day and he has not missed any doses. Patient denies any visual changes. Patient denies any weakness to his arms or legs. Patient denies any slurred speech or hesitancy of speech. Patient denies any headache or pain. Patient denies any fever, chills, cough, nausea or vomiting. Patient denies any loss of taste or smell. There is twitching to the patient's left cheek on exam.    The history is provided by the patient. NursingNotes were reviewed. REVIEW OF SYSTEMS    (2-9 systems for level 4, 10 or more for level 5)     Review of Systems   Constitutional: Negative for activity change, appetite change, chills, fever and unexpected weight change. HENT: Negative for drooling, ear pain, nosebleeds, sinus pressure and trouble swallowing.     Respiratory: Negative for cough, chest tightness and shortness of breath. Cardiovascular: Negative for chest pain and leg swelling. Gastrointestinal: Negative for abdominal pain, diarrhea, nausea and vomiting. Endocrine: Negative for polydipsia and polyphagia. Genitourinary: Negative for dysuria, flank pain and frequency. Musculoskeletal: Negative for back pain and myalgias. Skin: Negative for pallor and rash. Neurological: Positive for tremors ( Left cheek) and facial asymmetry. Negative for syncope, weakness and headaches. Left cheek tremor   Hematological: Does not bruise/bleed easily. All other systems reviewed and are negative. Except as noted above the remainder of the review of systems was reviewed and negative. PAST MEDICAL HISTORY   History reviewed. No pertinent past medical history. SURGICALHISTORY     History reviewed. No pertinent surgical history. CURRENT MEDICATIONS       Previous Medications    BRIMONIDINE TARTRATE-TIMOLOL OP    Apply to eye    FINASTERIDE (PROSCAR) 5 MG TABLET    Take 1 tablet by mouth daily    GLUCOSAMINE-CHONDROIT-VIT C-MN (GLUCOSAMINE 1500 COMPLEX) CAPS    Take by mouth    LATANOPROST (XALATAN) 0.005 % OPHTHALMIC SOLUTION    1 drop nightly    LEVETIRACETAM (KEPPRA) 500 MG TABLET    Take 500 mg by mouth 2 times daily    LORATADINE (CLARITIN) 10 MG CAPSULE    Take 10 mg by mouth daily    LUTEIN 20 MG CAPS    Take by mouth    MULTIPLE VITAMIN (ONE-A-DAY MENS PO)    Take by mouth    OMEGA 3 1200 MG CAPS    Take by mouth    TAMSULOSIN (FLOMAX) 0.4 MG CAPSULE    TAKE 1 CAPSULE BY MOUTH DAILY    TIMOLOL (BETIMOL) 0.5 % OPHTHALMIC SOLUTION    1 drop 2 times daily       ALLERGIES     Patient has no known allergies. FAMILY HISTORY     History reviewed. No pertinent family history.        SOCIAL HISTORY       Social History     Socioeconomic History    Marital status:      Spouse name: None    Number of children: None    Years of education: None    Highest education level: None Occupational History    None   Social Needs    Financial resource strain: None    Food insecurity     Worry: None     Inability: None    Transportation needs     Medical: None     Non-medical: None   Tobacco Use    Smoking status: Never Smoker    Smokeless tobacco: Never Used   Substance and Sexual Activity    Alcohol use: Yes     Alcohol/week: 1.0 standard drinks     Types: 1 Glasses of wine per week    Drug use: No    Sexual activity: Yes   Lifestyle    Physical activity     Days per week: None     Minutes per session: None    Stress: None   Relationships    Social connections     Talks on phone: None     Gets together: None     Attends Caodaism service: None     Active member of club or organization: None     Attends meetings of clubs or organizations: None     Relationship status: None    Intimate partner violence     Fear of current or ex partner: None     Emotionally abused: None     Physically abused: None     Forced sexual activity: None   Other Topics Concern    None   Social History Narrative    None       SCREENINGS      @FLOW([id number])@      PHYSICAL EXAM    (up to 7 for level 4, 8 or more for level 5)     ED Triage Vitals [12/01/20 1725]   BP Temp Temp Source Pulse Resp SpO2 Height Weight   (!) 171/77 98 °F (36.7 °C) Oral 67 18 94 % 5' 6\" (1.676 m) 174 lb 4 oz (79 kg)       Physical Exam  Vitals signs and nursing note reviewed. Constitutional:       General: He is awake. He is not in acute distress. Appearance: Normal appearance. He is well-developed and normal weight. He is not ill-appearing, toxic-appearing or diaphoretic. Comments: No photophobia. No phonophobia. HENT:      Head: Normocephalic and atraumatic. No Ruggiero's sign. Right Ear: External ear normal.      Left Ear: External ear normal.      Nose: Nose normal. No congestion or rhinorrhea. Mouth/Throat:      Mouth: Mucous membranes are moist.      Pharynx: Oropharynx is clear.  No oropharyngeal exudate or posterior oropharyngeal erythema. Eyes:      General: No scleral icterus. Right eye: No foreign body or discharge. Left eye: No discharge. Extraocular Movements: Extraocular movements intact. Conjunctiva/sclera: Conjunctivae normal.      Left eye: No exudate. Pupils: Pupils are equal, round, and reactive to light. Neck:      Musculoskeletal: Normal range of motion and neck supple. No neck rigidity. Vascular: No JVD. Trachea: No tracheal deviation. Comments: No meningismus. Cardiovascular:      Rate and Rhythm: Normal rate and regular rhythm. Pulses: Normal pulses. Heart sounds: Normal heart sounds. Heart sounds not distant. No murmur. No friction rub. No gallop. Pulmonary:      Effort: Pulmonary effort is normal. No respiratory distress. Breath sounds: Normal breath sounds. No stridor. No wheezing, rhonchi or rales. Chest:      Chest wall: No tenderness. Abdominal:      General: Abdomen is flat. Bowel sounds are normal. There is no distension. Palpations: Abdomen is soft. There is no mass. Tenderness: There is no abdominal tenderness. There is no right CVA tenderness, left CVA tenderness, guarding or rebound. Hernia: No hernia is present. Musculoskeletal: Normal range of motion. General: No swelling, tenderness, deformity or signs of injury. Lymphadenopathy:      Head:      Right side of head: No submental adenopathy. Left side of head: No submental adenopathy. Skin:     General: Skin is warm and dry. Capillary Refill: Capillary refill takes less than 2 seconds. Coloration: Skin is not jaundiced or pale. Findings: No bruising, erythema, lesion or rash. Neurological:      General: No focal deficit present. Mental Status: He is alert and oriented to person, place, and time. Mental status is at baseline. GCS: GCS eye subscore is 4. GCS verbal subscore is 5. GCS motor subscore is 6. Cranial Nerves: Facial asymmetry ( Facial droop with twitching of the left cheek) present. No cranial nerve deficit. Sensory: No sensory deficit. Motor: Tremor ( left cheek) present. No weakness, atrophy, abnormal muscle tone, seizure activity or pronator drift. Coordination: Coordination is intact. Romberg sign negative. Coordination normal. Finger-Nose-Finger Test normal. Rapid alternating movements normal.      Gait: Gait is intact. Deep Tendon Reflexes: Reflexes are normal and symmetric. Comments: No pronator drift. No leg drift. Normal rapid alternating movements. Negative finger-nose-finger. Negative heel shin. Muscle strength is 5 out of 5 bilateral upper extremities grossly and 5 out of 5 bilateral lower extremities grossly. Psychiatric:         Mood and Affect: Mood normal.         Behavior: Behavior normal. Behavior is cooperative. Thought Content: Thought content normal.         Judgment: Judgment normal.         DIAGNOSTIC RESULTS     EKG: All EKG's are interpreted by the Emergency Department Physician who either signs or Co-signsthis chart in the absence of a cardiologist.    EKG: Sinus rhythm at 61 bpm, normal axis, normal ST segments, QT intervals 372 ms. No PVCs. RADIOLOGY:   Non-plain filmimages such as CT, Ultrasound and MRI are read by the radiologist. Plain radiographic images are visualized and preliminarily interpreted by the emergency physician with the below findings:        Interpretation per the Radiologist below, if available at the time ofthis note:    [address]   Final Result      THERE IS A MASS [address] PRECENTRAL GYRUS AS DESCRIBED ABOVE PLEASE SEE MRI REPORT CARE EVERYWHERE [address] [place] [address]. IF SIGNS OR SYMPTOMS PERSIST THEN CONSIDER REPEAT MRI TO FURTHER EVALUATE.       All CT scans at this facility use dose modulation, iterative reconstruction, and/or weight based dosing when appropriate to reduce radiation dose to as low as reasonably achievable. ED BEDSIDE ULTRASOUND:   Performed by ED Physician - none    LABS:  Labs Reviewed   CBC WITH AUTO DIFFERENTIAL - Abnormal; Notable for the following components:       Result Value    MCHC 32.6 (*)     Eosinophils Absolute 1.3 (*)     All other components within normal limits   COMPREHENSIVE METABOLIC PANEL - Abnormal; Notable for the following components:    Anion Gap 8 (*)     Glucose 173 (*)     Alkaline Phosphatase 142 (*)     All other components within normal limits   POCT GLUCOSE - Abnormal; Notable for the following components:    POC Glucose 160 (*)     All other components within normal limits   POCT GLUCOSE - Normal   CK   MAGNESIUM   PROTIME-INR   TROPONIN   TSH WITH REFLEX   URINE RT REFLEX TO CULTURE   APTT   TYPE AND SCREEN       All other labs were within normal range or not returned as of this dictation. EMERGENCY DEPARTMENT COURSE and DIFFERENTIAL DIAGNOSIS/MDM:   Vitals:    Vitals:    12/01/20 1725 12/01/20 1815 12/01/20 1830 12/01/20 1858   BP: (!) 171/77 (!) 163/79 (!) 153/79 (!) 148/76   Pulse: 67 64 63 66   Resp: 18 18 27 16   Temp: 98 °F (36.7 °C)      TempSrc: Oral      SpO2: 94% 96% 100% 96%   Weight: 174 lb 4 oz (79 kg)      Height: 5' 6\" (1.676 m)            MDM  Neurology consult was obtained with Dr. Sony Reyes and the case was discussed with him. He recommends a noncontrasted scan to assess if the patient has bleeding or not and to start the patient on 1000 mg of Keppra IV. He states the patient should be brought into the hospital and further work-up done to the patient can get proper care. Patient is not a TPA candidate he has a brain tumor that very well could be cancerous. The brain tumor is causing the symptoms. I spoke with the patient's wife I discussed the findings and the plan of care with her.     I discussed the case with Dr. Nevin Johnson who is excepted the patient. CONSULTS:  IP CONSULT TO NEUROLOGY    PROCEDURES:  Unless otherwise noted below, none     Procedures    FINAL IMPRESSION      1. Atypical seizure ([facility] [address]    2. Facial spasm    3. Facial droop    4. BT (brain tumor) [facility])          DISPOSITION/PLAN   DISPOSITION Decision To Admit 12/01/2020 07:06:37 PM      PATIENT REFERRED TO:  No follow-up provider specified.     DISCHARGE MEDICATIONS:  New Prescriptions    No medications on file          (Please note that portions of this note were completed with a voice recognition program.  Efforts were made to edit the dictations but occasionally words are mis-transcribed.)    Grace Oviedo DO (electronically signed)  Attending Emergency Physician          Grace Oviedo DO  12/01/20 2000

## 2020-12-02 NOTE — ED NOTES
Pt estrellita. Well water po, 0 problems, 0 drooling, 0 coughing, pt a&ox4.      Kalen Sousa RN  12/01/20 2000

## 2020-12-02 NOTE — CARE COORDINATION
[facility] EMERGENCY [facility] AT [place] Case Management Initial Discharge Assessment    Met with patient to discuss discharge plan. PCP: Zoraida Marquez MD                                  Date of Last Visit: Patient has only had phone or virtual visits recently. If no PCP, list provided? N/A    Discharge Planning    Living Arrangements: Patient lives independently at home in a one-story house. Who do you live with? Spouse    Who helps you with your care:  self    If lives at home:     Do you have any barriers navigating in your home? no    Patient can perform ADL? Yes    Current Services (outpatient and in home) :  None    Dialysis: No    Is transportation available to get to your appointments? Yes - Patient or his spouse drives. DME Equipment:  no    Respiratory equipment: None    Respiratory provider:  no     Pharmacy:  yes - [address] with Medication Assistance Program?  No      Patient agreeable to [facility]? Declined    Patient agreeable to SNF/Rehab? Declined    Other discharge needs identified? Other - To be determined following evaluation of currnet medical issues. Freedom of choice list provided with basic dialogue that supports the patient's individualized plan of care/goals and shares the quality data associated with the providers. N/A - Patient declined list. CM did explain freedom of choice for post-acute services. Does Patient Have a High-Risk for Readmission Diagnosis (CHF, PN, MI, COPD)? No     History: BPH    The plan for Transition of Care is related to the following treatment goals: Evaluation of facial droop    Initial Discharge Plan? (Note: please see concurrent daily documentation for any updates after initial note). Patient wants to discharge home w/spouse; he declined post-acute services when in ER.     The Patient and/or patient representative: Patient was provided with choice of any post-acute providers for care and equipment and agrees with tentative discharge plan to home.     Electronically signed by Luisito Umanzor RN on 12/1/2020 at 7:49 PM

## 2020-12-02 NOTE — PROGRESS NOTES
Physical Therapy Med Surg Initial Assessment  Facility/Department: [facility]  Room: O927/A709-78       NAME: Dinh Sexton  : 10/10/1930 (26 y.o.)  MRN: [id number]  CODE STATUS: Full Code    Date of Service: 2020    Patient Diagnosis(es): Brain mass [G93.89]   Chief Complaint   Patient presents with    Facial Droop     since 4 pm     Patient Active Problem List    Diagnosis Date Noted    Brain mass 2020    TIA (transient ischemic attack) 2020    BPH (benign prostatic hyperplasia) 2013        Past Medical History:   Diagnosis Date    Cerebral artery occlusion with cerebral infarction [facility])      Past Surgical History:   Procedure Laterality Date    COLONOSCOPY      EYE SURGERY         Chart Reviewed: Yes  Patient assessed for rehabilitation services?: Yes  Family / Caregiver Present: No    Restrictions:  Restrictions/Precautions: Fall Risk(moderate fall risk per Longo score)     SUBJECTIVE: Subjective: pt reports symptoms resolved    Pain  Pre Treatment Pain Screening  Pain at present: 0    Post Treatment Pain Screening:   Pain Screening  Patient Currently in Pain: No  Pain Assessment  Pain Level: 0    Prior Level of Function:  Social/Functional History  Lives With: Spouse  Type of Home: House  Home Layout: One level  Home Access: Level entry  Bathroom Shower/Tub: Tub/Shower unit  Bathroom Equipment: Grab bars in shower, Shower chair  Home Equipment: (none)  ADL Assistance: Independent  Homemaking Assistance: Independent  Ambulation Assistance: Independent(no AD)  Transfer Assistance: Independent  Active : No  Patient's  Info: wife does most of driving    OBJECTIVE:   Vision: Impaired  Vision Exceptions: Wears glasses for reading  Hearing: Within functional limits    Cognition:  Overall Orientation Status: Within Normal Limits  Follows Commands: Impaired    Observation/Palpation  Posture: Good  Observation: pleasant, cooperative, no acute distress, face symetrical    ROM:  RLE PROM: WFL  RLE AROM: WFL  LLE PROM: WFL  LLE AROM : WFL    Strength:  Strength RLE  Strength RLE: WFL  Strength LLE  Strength LLE: WFL  Strength Other  Other: MMT deferred d/t brain mass/contraindication    Neuro:  Balance  Posture: Good  Sitting - Static: Good  Sitting - Dynamic: Good  Standing - Static: Good  Standing - Dynamic: Good  Comments: intact righting reactions in standing, no LOB with stop turns <4 second, picking up object, dynamic reactive balance with ankle strategy observed     Tone RLE  RLE Tone: Normotonic  Tone LLE  LLE Tone: Normotonic  Motor Control  Gross Motor?: WFL  Sensation  Overall Sensation Status: (pt denies paraesthesias)    Bed mobility  Supine to Sit: Modified independent    Transfers  Sit to Stand: Independent  Stand to sit: Independent    Ambulation  Ambulation?: Yes  Ambulation 1  Surface: level tile  Device: No Device  Assistance: Supervision  Quality of Gait: normalized, reciprocal gait  Gait Deviations: None  Distance: 150ft              Activity Tolerance  Activity Tolerance: Patient Tolerated treatment well          PT Education  PT Education: PT Role    ASSESSMENT:   Decision Making: Low Complexity  History: high  Exam: low  Clinical Presentation: low  No Skilled PT: Independent with functional mobility     Prognosis: Good  Barriers to Learning: none    DISCHARGE RECOMMENDATIONS:  No Skilled PT: Independent with functional mobility     Assessment: Pt presents with indep functional mobility, intact balance reactions, and no LOB with higher level balance tasks. Pt demonstrate safe mobility for d/c home at this time with no acute PT needs identified.  Thank you for your referral.  REQUIRES PT FOLLOW UP: No      PLAN OF CARE:  Plan  Times per week: eval only  Safety Devices  Type of devices: Left in chair, Call light within reach    Goals:  Patient goals : \"go home\"    [facility] (6 CLICK) BASIC MOBILITY  AM-PAC Inpatient Mobility Raw Score : 23     Therapy Time:   Individual   Time In [address] [place] [address]   Time Out 0948   Minutes [address], [place], 12/02/20 at 10:30 AM         Definitions for assistance levels  Independent = pt does not require any physical supervision or assistance from another person for activity completion. Device may be needed.   Stand by assistance = pt requires verbal cues or instructions from another person, close to but not touching, to perform the activity  Minimal assistance= pt performs 75% or more of the activity; assistance is required to complete the activity  Moderate assistance= pt performs 50% of the activity; assistance is required to complete the activity  Maximal assistance = pt performs 25% of the activity; assistance is required to complete the activity  Dependent = pt requires total physical assistance to accomplish the task

## 2020-12-02 NOTE — CARE COORDINATION
Pt. Sitting upright in chair on the telephone, afterwards assessed needs for d/c.  Pt declined any services offered.  Electronically signed by Nela Francis RN on 12/2/2020 at 10:24 AM

## 2020-12-02 NOTE — H&P
Hospital Medicine  History and Physical    Patient:  Luz Elena Man  MRN: [id number]    CHIEF COMPLAINT:    Chief Complaint   Patient presents with    Facial Droop     since 4 pm       History Obtained From:  patient  Primary Care Physician: Alexia Hunter MD    HISTORY OF PRESENT ILLNESS:   The patient is a 80 y.o. male who presents with history of recently diagnosed brain mass pending work-up, BPH, glaucoma who presents to [facility] [place] emergency room after an episode of left-sided facial droop and left face twitching since 4 PM today. The patient reports a recent diagnosis of brain mass with a follow-up appointment on December 10. However the symptoms today are new. He denies chest pain, shortness of breath, nausea, vomiting. He is taking Keppra and has been tolerating well. Past Medical History:      Diagnosis Date    Cerebral artery occlusion with cerebral infarction [facility])        Past Surgical History:      Procedure Laterality Date    COLONOSCOPY      EYE SURGERY         Medications Prior to Admission:    Prior to Admission medications    Medication Sig Start Date End Date Taking?  Authorizing Provider   melatonin 5 MG TABS tablet Take 5 mg by mouth daily   Yes Historical Provider, MD   levETIRAcetam (KEPPRA) 500 MG tablet Take 500 mg by mouth 2 times daily 11/18/20  Yes Historical Provider, MD   finasteride (PROSCAR) 5 MG tablet Take 1 tablet by mouth daily 11/9/20  Yes Oniel Burnette MD   tamsulosin (FLOMAX) 0.4 MG capsule TAKE 1 CAPSULE BY MOUTH DAILY 3/3/20  Yes Oniel Burnette MD   timolol (BETIMOL) 0.5 % ophthalmic solution 1 drop 2 times daily   Yes Historical Provider, MD   BRIMONIDINE TARTRATE-TIMOLOL OP Apply to eye   Yes Historical Provider, MD   latanoprost (XALATAN) 0.005 % ophthalmic solution 1 drop nightly   Yes Historical Provider, MD   Multiple Vitamin (ONE-A-DAY MENS PO) Take by mouth   Yes Historical Provider, MD   loratadine (CLARITIN) 10 MG capsule Take 10 mg by mouth daily   Yes Historical Provider, MD   Lutein 20 MG CAPS Take by mouth   Yes Historical Provider, MD   Omega 3 1200 MG CAPS Take by mouth   Yes Historical Provider, MD   Glucosamine-Chondroit-Vit C-Mn (GLUCOSAMINE 1500 COMPLEX) CAPS Take by mouth   Yes Historical Provider, MD       Allergies:  Patient has no known allergies. Social History:   TOBACCO:   reports that he has never smoked. He has never used smokeless tobacco.  ETOH:   reports current alcohol use of about 1.0 standard drinks of alcohol per week. Family History:   History reviewed. No pertinent family history. REVIEW OF SYSTEMS:  Ten systems reviewed and negative except for as above. Physical Exam:    Vitals: BP (!) 170/66   Pulse 60   Temp 97.7 °F (36.5 °C) (Oral)   Resp 18   Ht 5' 6\" (1.676 m)   Wt 174 lb 4 oz (79 kg)   SpO2 98%   BMI 28.12 kg/m²   Constitutional: alert, appears stated age and cooperative  Skin: Skin color, texture, turgor normal. No rashes or lesions  Eyes:Eye: Normal external eye, conjunctiva, SHONNA. ENT: Head: Normocephalic, no lesions, without obvious abnormality. Neck: no adenopathy, no carotid bruit, no JVD, supple, symmetrical, trachea midline and thyroid not enlarged, symmetric, no tenderness/mass/nodules  Respiratory: clear to auscultation bilaterally  Cardiovascular: regular rate and rhythm, S1, S2 normal, no murmur, click, rub or gallop  Gastrointestinal: soft, non-tender; bowel sounds normal; no masses,  no organomegaly  Genitourinary: Deferred  Musculoskeletal:extremities normal, atraumatic, no cyanosis or edema  Neurologic: Mental status AAOx3 No facial asymmetry. Mild left facial droop. Normal muscle strength b/l. CN II-XII grossly intact. Psychiatric: Appropriate mood and affect.  Good insight and judgement  Hematologic: No obvious bruising or bleeding    Recent Labs     12/01/20  1745   WBC 7.2   HGB 14.8        Recent Labs     12/01/20  1745 12/01/20  1748     --    K 4.3  --    CL 98  -- CO2 29  --    BUN 14  --    CREATININE 0.79  --    GLUCOSE 173* 160   AST 20  --    ALT 19  --    BILITOT 0.4  --    ALKPHOS 142*  --      Troponin T:   Recent Labs     12/01/20 1745   TROPONINI <0.010     INR:   Recent Labs     12/01/20 1745   INR 1.0     -----------------------------------------------------------------   Ct Head Wo Contrast    Result Date: 12/1/2020  CT HEAD WO CONTRAST CLINICAL HISTORY:  History of brain tumor seen on MRI within the last 2 weeks. Diagnosed with seizures. Facial droop on the left and left cheek twitching, seen on prior evaluation MRI report CARE EVERYWHERE Philip 58: \"areas of enhancement in the right pre and postcentral gyri could reflect subacute infarcts, the appearance on the CT and nonenhanced MRI is atypical for a subacute infarct and suggestive of an underlying neoplastic process such as lymphoma or an intracranial metastasis. \" Please see that report for full details COMPARISON: None TECHNIQUE: Multiple unenhanced serial axial images of the brain from the vertex of the skull to the base of the skull were performed. FINDINGS: The ventricles are dilated. This is compensatory to the surrounding moderate generalized parenchymal volume loss. No mass. No midline shift. The cisterns are patent. There are white matter and periventricular changes most likely consistent with chronic small vessel disease. Question mass within the region of the right precentral gyrus, image 23 series 4 measuring approximately 1.6 x 1.6 cm. No acute  extra-axial findings. The visualized osseous structures are unremarkable. There is a retention cyst or polyp measuring 9 mm within the left maxillary sinus. The mastoids are well aerated. THERE IS A MASS SEEN WITHIN THE RIGHT PRECENTRAL GYRUS AS DESCRIBED ABOVE PLEASE SEE MRI REPORT CARE EVERYWHERE [address] [place] [address] FOR ADDITIONAL DETAILS.  IF SIGNS OR SYMPTOMS PERSIST THEN CONSIDER REPEAT MRI TO FURTHER EVALUATE. All CT scans at this facility use dose modulation, iterative reconstruction, and/or weight based dosing when appropriate to reduce radiation dose to as low as reasonably achievable. Assessment and Plan   1. Brain mass with neurologic findings  1. ER discussed with Dr. Sanjiv Almazan by phone, patient's Keppra increased to 1 g twice daily. Neurology consultation. Neurochecks every 4 hours  2. BPH  1. Continue home medications  3. DVT prophylaxis  1.  Lovenox subcu    Patient Active Problem List   Diagnosis Code    BPH (benign prostatic hyperplasia) N40.0    Brain mass G93.89    TIA (transient ischemic attack) G45.9       Gerri Boyce MD  Admitting Hospitalist

## 2020-12-03 NOTE — PROGRESS NOTES
[facility] Neurology Daily Progress Note  Name: Isabel Merritt  Age: 80 y.o. Gender: male  CodeStatus: Full Code  Allergies: No Known Allergies    Chief Complaint:Facial Droop (since 4 pm)    Primary Care Provider: Crystal Esquivel MD  InpatientTreatment Team: Treatment Team: Attending Provider: [facility] DO; Consulting Physician: Angelo Canela MD; Utilization Reviewer: Annette May RN; : Juan Jose Haynes RN; Nursing Student: Fabian Garcia; Registered Nurse: Agustin Elizalde RN; Patient Care Tech: Lynn Hopkins  Admission Date: 12/1/2020      HPI   80-year-old right-hand gentleman who started to have focal partial seizures with clearing of his lips. This is unilateral as patient has a Bell's palsy on the left. We were contacted from the emergency room as he was almost in a status and we had increased his Keppra to 1000 g twice a day and he has responded to this. He does have some minor side effects of sedation though appears to be doing otherwise well without any cognitive issues. He was recently admitted to [facility] clinic and initially he was diagnosed with a stroke he had a complete cardiovascular and cerebrovascular work-up which are reviewed. Patient then had an MRI of the brain with contrast which shows a small lesion in the precentral gyrus on the right suspicious for either a lymphoma or metastasis. Residual stroke is still a possibility. Patient reports no headache and he appears to be actually quite aware of his diagnosis. He has a left facial paralysis and he does not know when this occurred he reports he always has mild on the right. Pt seen and examined for neuro follow-up for focal partial seizure. Currently alert and oriented x3, no acute distress, cooperative. He reports that left-sided facial palsy is not new but it is old. No recurrent seizure activity reported. No tremors or twitching. He is tolerating Keppra without agitation or mood changes.   No focal asymmetry (left, old) present. No dysarthria. Motor: No weakness, tremor or seizure activity. Gait: Gait normal.               Medications:  Reviewed    Infusion Medications:   Scheduled Medications:    sodium chloride flush  10 mL Intravenous 2 times per day    enoxaparin  40 mg Subcutaneous Daily    levetiracetam  1,000 mg Intravenous Q12H    finasteride  5 mg Oral Daily    latanoprost  1 drop Both Eyes Nightly    melatonin  6 mg Oral Nightly    tamsulosin  0.4 mg Oral Daily    brimonidine  1 drop Both Eyes BID    timolol  1 drop Both Eyes BID     PRN Meds: sodium chloride flush, acetaminophen **OR** acetaminophen, polyethylene glycol, promethazine **OR** ondansetron    Labs:   Recent Labs     12/01/20  1745 12/02/20  1038   WBC 7.2 5.9   HGB 14.8 14.3   HCT 45.3 42.6    158     Recent Labs     12/01/20  1745 12/02/20  1038    139   K 4.3 4.0   CL 98 103   CO2 29 27   BUN 14 12   CREATININE 0.79 0.79   CALCIUM 9.0 9.1     Recent Labs     12/01/20  1745   AST 20   ALT 19   BILITOT 0.4   ALKPHOS 142*     Recent Labs     12/01/20  1745   INR 1.0     Recent Labs     12/01/20  1745   CKTOTAL 55   TROPONINI <0.010       Urinalysis:   Lab Results   Component Value Date    NITRU Negative 12/02/2020    BLOODU Negative 12/02/2020    SPECGRAV 1.006 12/02/2020    GLUCOSEU Negative 12/02/2020       Radiology:   Most recent    EEG No procedure found. MRI of Brain No results found for this or any previous visit. No results found for this or any previous visit. MRA of the Head and Neck: No results found for this or any previous visit. No results found for this or any previous visit. No results found for this or any previous visit. CT of the Head:   Results for orders placed during the hospital encounter of 12/01/20   CT HEAD WO CONTRAST    Narrative CT HEAD WO CONTRAST    CLINICAL HISTORY:  History of brain tumor seen on MRI within the last 2 weeks. Diagnosed with seizures. Facial droop on the left and left cheek twitching, seen on prior evaluation     MRI report CARE EVERYWHERE Philip 58:  \"areas of enhancement in the right pre and   postcentral gyri could reflect subacute infarcts, the appearance on the   CT and nonenhanced MRI is atypical for a subacute infarct and suggestive   of an underlying neoplastic process such as lymphoma or an intracranial   metastasis. \" Please see that report for full details    COMPARISON: None    TECHNIQUE: Multiple unenhanced serial axial images of the brain from the vertex of the skull to the base of the skull were performed. FINDINGS: The ventricles are dilated. This is compensatory to the surrounding moderate generalized parenchymal volume loss. No mass. No midline shift. The cisterns are patent. There are white matter and periventricular changes most likely consistent   with chronic small vessel disease. Question mass within the region of the right precentral gyrus, image 23 series 4 measuring approximately 1.6 x 1.6 cm. No acute  extra-axial findings. The visualized osseous structures are unremarkable. There is a retention cyst or polyp measuring 9 mm within the left maxillary sinus. The mastoids are well aerated. Impression THERE IS A MASS SEEN WITHIN THE RIGHT PRECENTRAL GYRUS AS DESCRIBED ABOVE PLEASE SEE MRI REPORT CARE EVERYWHERE [address] [place] [address] FOR ADDITIONAL DETAILS. IF SIGNS OR SYMPTOMS PERSIST THEN CONSIDER REPEAT MRI TO FURTHER EVALUATE. All CT scans at this facility use dose modulation, iterative reconstruction, and/or weight based dosing when appropriate to reduce radiation dose to as low as reasonably achievable. No results found for this or any previous visit. No results found for this or any previous visit. Carotid duplex: No results found for this or any previous visit. No results found for this or any previous visit. No results found for this or any previous visit. Echo No results found for this or any previous visit. 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 Patient was treated with Torrey Curls in the emergency room we were contacted for the same and therefore our input in the emergency room as well. CT scan of the head does not anything significant as this was a noncontrast CT. Records from [facility] are obtained and patient has a lesion in the precentral gyrus on the MRI which is enhancing without leptomeningeal enhancement. There is a question that this is lymphoma versus metastasis though I truly think that this could be a subacute stroke as well. Patient has an appointment December 10 with the neurosurgeons for possible biopsy. For now I recommended that we increase his Keppra to 1000 mg twice a day obtain an EEG watch him overnight for 24 hours this is a high risk for status epilepticus given the area of involvement and if he does not respond to the current medications we may have to add a second anticonvulsant at some point in time and this would be Dilantin. Patient's examination otherwise appears to be nonfocal except for the left lower motor neuron type of facial palsy consistent with the possibility of an old Bell's palsy. He is not quite sure when this occurred as he always tells me that he smiles on the right face.   Findings were discussed with the emergency room and Dr. Oanh Graham hospitalist    EEG completed/  No foacl seizures,  I saw myself  No recurrent seizure activity  Tolerating Keppra without agitation or mood changes or adverse effect  Okay to DC from neurology standpoint  Patient to follow-up with [facility]  I discussed with him that if no surgery is done,  To call me and I will follow here locally    I have personally performed a face to face diagnostic evaluation on this patient, reviewed all data and investigations, and am the sole provider of all clinical decisions on the neurological status of this patient. Neuo f/u for seizures and abnormal studies      Samuel Alarcon MD, [address], American Board of Psychiatry & Neurology  Board Certified in Vascular Neurology  Board Certified in Neuromuscular Medicine  Certified in Neurorehabilitation       Collaborating physicians: Dr Destin Alarcon    Electronically signed by MATHEUS Lewis CNP on 12/3/2020 at 1:28 PM

## 2020-12-03 NOTE — DISCHARGE SUMMARY
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 Discussed with Dr. Adams Gandhi, neurologist.  -Plan to discharge home if remains stable by tomorrow.     12/3  -Discharge home if okay with neurology  -Patient has follow-up with neurologist at [facility] on 12/10/2020, encouraged to keep that appointment.  -No seizures since last night  -Continue Keppra    Exam on discharge:  BP (!) 141/74   Pulse 55   Temp 97.5 °F (36.4 °C) (Oral)   Resp 17   Ht 5' 6\" (1.676 m)   Wt 174 lb 4 oz (79 kg)   SpO2 100%   BMI 28.12 kg/m²   General appearance: No apparent distress, appears stated age and cooperative. HEENT: Pupils equal, round, and reactive to light. Conjunctivae/corneas clear. Neck: Supple, with full range of motion. No jugular venous distention. Trachea midline. Respiratory:  Normal respiratory effort. Clear to auscultation, bilaterally without Rales/Wheezes/Rhonchi. Cardiovascular: Regular rate and rhythm with normal S1/S2 without murmurs, rubs or gallops. Abdomen: Soft, non-tender, non-distended with normal bowel sounds. Musculoskeletal: No clubbing, cyanosis or edema bilaterally. Full range of motion without deformity. Skin: Skin color, texture, turgor normal.  No rashes or lesions. Neuro: Non Focal. Symetrical motor and tone. Nl Comprehension, Alert,awake and oriented. NL CN. Symetrical tone and reflexes.   Psychiatric: Alert and oriented, thought content appropriate, normal insight  Capillary Refill: Brisk,< 3 seconds   Peripheral Pulses: +2 palpable, equal bilaterally     Patient was seen by the following consultants while admitted to [facility]:   Consults:  [address]    Significant Diagnostic Studies:    Refer to chart     Please refer to chart if no studies are shown here    Ct Head Wo Contrast    Result Date: 12/1/2020  CT HEAD WO CONTRAST CLINICAL HISTORY:  History of brain tumor seen on MRI within the last 2 weeks. Diagnosed with seizures. Facial droop on the left and left cheek twitching, seen on prior evaluation MRI report [facility]: \"areas of enhancement in the right pre and postcentral gyri could reflect subacute infarcts, the appearance on the CT and nonenhanced MRI is atypical for a subacute infarct and suggestive of an underlying neoplastic process such as lymphoma or an intracranial metastasis. \" Please see that report for full details COMPARISON: None TECHNIQUE: Multiple unenhanced serial axial images of the brain from the vertex of the skull to the base of the skull were performed. FINDINGS: The ventricles are dilated. This is compensatory to the surrounding moderate generalized parenchymal volume loss. No mass. No midline shift. The cisterns are patent. There are white matter and periventricular changes most likely consistent with chronic small vessel disease. Question mass within the region of the right precentral gyrus, image 23 series 4 measuring approximately 1.6 x 1.6 cm. No acute  extra-axial findings. The visualized osseous structures are unremarkable. There is a retention cyst or polyp measuring 9 mm within the left maxillary sinus. The mastoids are well aerated. THERE IS A MASS SEEN WITHIN THE RIGHT PRECENTRAL GYRUS AS DESCRIBED ABOVE PLEASE SEE MRI REPORT CARE EVERYWHERE [address] [place] [address] FOR ADDITIONAL DETAILS. IF SIGNS OR SYMPTOMS PERSIST THEN CONSIDER REPEAT MRI TO FURTHER EVALUATE.  All CT scans at this facility

## 2020-12-04 NOTE — TELEPHONE ENCOUNTER
[facility] Transitions Initial Follow Up Call    Outreach made within 2 business days of discharge: No    Patient: Maura Parekh Patient : 10/10/1930   MRN: [id number]  Reason for Admission: There are no discharge diagnoses documented for the most recent discharge. Discharge Date: 12/3/20       Spoke with: [facility] TO RETURN CALL    Discharge department/facility: [facility]    Scheduled appointment with PCP within 7-14 days - PCP FOLLOW-UP APPT WAS PREVIOUSLY CANCELED. Follow Up  No future appointments.     Jose Atkins

## 2020-12-04 NOTE — FLOWSHEET NOTE
Reviewed discharge instructions with both patient and wife including F/U appts and education on new prescription. She is currently here in patient room to provide ride home for her . Patient states he is very satisfied with care. Request for transport placed, patient discharged home with wife.

## 2020-12-06 NOTE — PROCEDURES
[address]                      [facility], [address] [facility]                          ELECTROENCEPHALOGRAM REPORT    PATIENT NAME: Bibiana Prince                      :        10/10/1930  MED REC NO:   [id number]                            ROOM:       [id number]  ACCOUNT NO:   [de-identified]                           ADMIT DATE: 2020  PROVIDER:     Adriano Vance MD    DATE OF EE2020    MEDICATIONS:  Lovenox, Keppra. EEG FINDINGS:  This is a spontaneous 21-channel EEG recording for this  patient with questionable seizures. The background rhythm of this EEG  shows a well-regulated 9 Hz posterior dominant rhythm of alpha. This is  symmetrical and attenuates with eye opening. EKG is monitored, this is  regular. Photic stimulation was performed without any photic responses. There is no photo response to seizures. Hyperventilation was performed  with good effort without any change in frequencies. The record remains  symmetrical throughout with well-regulated alpha rhythm seen throughout  the EEG recording with some surface muscle artifacts. IMPRESSION:  This is a normal well-regulated EEG recording. Clinical  correlation is recommended.       Kathleen Arndt MD    D: 2020 12:00:47       T: 2020 12:03:58     [email or web address]  Job#: [id number]     Doc#: [id number]    CC:

## 2020-12-15 NOTE — TELEPHONE ENCOUNTER
Maggie lord has a small spot on his brain, seen by MRI. He went to see a surgeon at [facility] in [facility] [employer], but he does not want to continue to drive that far. Can you please refer him to a surgeon in our area?

## 2021-01-01 ENCOUNTER — VIRTUAL VISIT (OUTPATIENT)
Dept: FAMILY MEDICINE CLINIC | Age: 86
End: 2021-01-01
Payer: MEDICARE

## 2021-01-01 ENCOUNTER — TELEPHONE (OUTPATIENT)
Dept: FAMILY MEDICINE CLINIC | Age: 86
End: 2021-01-01

## 2021-01-01 ENCOUNTER — APPOINTMENT (OUTPATIENT)
Dept: GENERAL RADIOLOGY | Age: 86
End: 2021-01-01
Payer: MEDICARE

## 2021-01-01 ENCOUNTER — HOSPITAL ENCOUNTER (OUTPATIENT)
Dept: MRI IMAGING | Age: 86
Discharge: HOME | End: 2021-01-21
Payer: MEDICARE

## 2021-01-01 ENCOUNTER — HOSPITAL ENCOUNTER (EMERGENCY)
Age: 86
Discharge: HOME | End: 2021-03-24
Attending: STUDENT IN AN ORGANIZED HEALTH CARE EDUCATION/TRAINING PROGRAM
Payer: MEDICARE

## 2021-01-01 VITALS
SYSTOLIC BLOOD PRESSURE: 152 MMHG | DIASTOLIC BLOOD PRESSURE: 79 MMHG | WEIGHT: 172 LBS | HEART RATE: 69 BPM | BODY MASS INDEX: 26.15 KG/M2 | RESPIRATION RATE: 16 BRPM | OXYGEN SATURATION: 93 % | TEMPERATURE: 98.3 F

## 2021-01-01 DIAGNOSIS — G93.89 BRAIN MASS: Primary | ICD-10-CM

## 2021-01-01 DIAGNOSIS — G40.901 STATUS EPILEPTICUS (HCC): ICD-10-CM

## 2021-01-01 DIAGNOSIS — G40.919 INTRACTABLE EPILEPSY WITHOUT STATUS EPILEPTICUS, UNSPECIFIED EPILEPSY TYPE (HCC): ICD-10-CM

## 2021-01-01 DIAGNOSIS — R35.0 URINARY FREQUENCY: ICD-10-CM

## 2021-01-01 DIAGNOSIS — G93.89 BRAIN MASS: ICD-10-CM

## 2021-01-01 DIAGNOSIS — Z78.9 DECREASED ACTIVITIES OF DAILY LIVING (ADL): ICD-10-CM

## 2021-01-01 DIAGNOSIS — D43.4 NEOPLASM OF UNCERTAIN BEHAVIOR OF BRAIN AND SPINAL CORD (HCC): ICD-10-CM

## 2021-01-01 DIAGNOSIS — D43.4 NEOPLASM OF UNCERTAIN BEHAVIOR OF BRAIN AND SPINAL CORD (HCC): Primary | ICD-10-CM

## 2021-01-01 DIAGNOSIS — R53.1 GENERALIZED WEAKNESS: Primary | ICD-10-CM

## 2021-01-01 DIAGNOSIS — D43.2 NEOPLASM OF UNCERTAIN BEHAVIOR OF BRAIN AND SPINAL CORD (HCC): Primary | ICD-10-CM

## 2021-01-01 DIAGNOSIS — Z87.898 HISTORY OF BRAIN TUMOR: ICD-10-CM

## 2021-01-01 DIAGNOSIS — D43.2 NEOPLASM OF UNCERTAIN BEHAVIOR OF BRAIN AND SPINAL CORD (HCC): ICD-10-CM

## 2021-01-01 DIAGNOSIS — I63.9 CEREBROVASCULAR ACCIDENT (CVA), UNSPECIFIED MECHANISM (HCC): ICD-10-CM

## 2021-01-01 DIAGNOSIS — G81.90 HEMIPARESIS, UNSPECIFIED HEMIPARESIS ETIOLOGY, UNSPECIFIED LATERALITY (HCC): ICD-10-CM

## 2021-01-01 LAB
ALBUMIN SERPL-MCNC: 4 G/DL (ref 3.5–4.6)
ALP BLD-CCNC: 138 U/L (ref 35–104)
ALT SERPL-CCNC: 44 U/L (ref 0–41)
ANION GAP SERPL CALCULATED.3IONS-SCNC: 10 MEQ/L (ref 9–15)
APTT: 33.4 SEC (ref 24.4–36.8)
AST SERPL-CCNC: 36 U/L (ref 0–40)
BASOPHILS ABSOLUTE: 0 K/UL (ref 0–0.2)
BASOPHILS RELATIVE PERCENT: 0.3 %
BILIRUB SERPL-MCNC: 0.3 MG/DL (ref 0.2–0.7)
BILIRUBIN URINE: NEGATIVE
BLOOD, URINE: NEGATIVE
BUN BLDV-MCNC: 10 MG/DL (ref 8–23)
CALCIUM SERPL-MCNC: 9.3 MG/DL (ref 8.5–9.9)
CHLORIDE BLD-SCNC: 99 MEQ/L (ref 95–107)
CLARITY: CLEAR
CO2: 29 MEQ/L (ref 20–31)
COLOR: YELLOW
CREAT SERPL-MCNC: 0.7 MG/DL (ref 0.7–1.2)
EOSINOPHILS ABSOLUTE: 0.7 K/UL (ref 0–0.7)
EOSINOPHILS RELATIVE PERCENT: 9.6 %
GFR AFRICAN AMERICAN: >60
GFR NON-AFRICAN AMERICAN: >60
GLOBULIN: 3.1 G/DL (ref 2.3–3.5)
GLUCOSE BLD-MCNC: 116 MG/DL (ref 70–99)
GLUCOSE URINE: NEGATIVE MG/DL
HCT VFR BLD CALC: 48.5 % (ref 42–52)
HEMOGLOBIN: 15.7 G/DL (ref 14–18)
INR BLD: 0.9
KEPPRA: 33 UG/ML (ref 12–46)
KETONES, URINE: NEGATIVE MG/DL
LACTIC ACID: 1.3 MMOL/L (ref 0.5–2.2)
LEUKOCYTE ESTERASE, URINE: NEGATIVE
LYMPHOCYTES ABSOLUTE: 1.5 K/UL (ref 1–4.8)
LYMPHOCYTES RELATIVE PERCENT: 20.3 %
MCH RBC QN AUTO: 28.6 PG (ref 27–31.3)
MCHC RBC AUTO-ENTMCNC: 32.3 % (ref 33–37)
MCV RBC AUTO: 88.5 FL (ref 80–100)
MONOCYTES ABSOLUTE: 0.6 K/UL (ref 0.2–0.8)
MONOCYTES RELATIVE PERCENT: 8.8 %
NEUTROPHILS ABSOLUTE: 4.5 K/UL (ref 1.4–6.5)
NEUTROPHILS RELATIVE PERCENT: 61 %
NITRITE, URINE: NEGATIVE
PDW BLD-RTO: 14.3 % (ref 11.5–14.5)
PH UA: 6.5 (ref 5–9)
PLATELET # BLD: 189 K/UL (ref 130–400)
POTASSIUM SERPL-SCNC: 4 MEQ/L (ref 3.4–4.9)
PRO-BNP: 42 PG/ML
PROCALCITONIN: 0.08 NG/ML (ref 0–0.15)
PROTEIN UA: NEGATIVE MG/DL
PROTHROMBIN TIME: 12.6 SEC (ref 12.3–14.9)
RBC # BLD: 5.48 M/UL (ref 4.7–6.1)
SARS-COV-2, NAAT: NOT DETECTED
SODIUM BLD-SCNC: 138 MEQ/L (ref 135–144)
SPECIFIC GRAVITY UA: 1.02 (ref 1–1.03)
TOTAL CK: 42 U/L (ref 0–190)
TOTAL PROTEIN: 7.1 G/DL (ref 6.3–8)
TROPONIN: <0.01 NG/ML (ref 0–0.01)
URINE REFLEX TO CULTURE: NORMAL
UROBILINOGEN, URINE: 0.2 E.U./DL
WBC # BLD: 7.3 K/UL (ref 4.8–10.8)

## 2021-01-01 PROCEDURE — 99283 EMERGENCY DEPT VISIT LOW MDM: CPT

## 2021-01-01 PROCEDURE — 83880 ASSAY OF NATRIURETIC PEPTIDE: CPT

## 2021-01-01 PROCEDURE — 85730 THROMBOPLASTIN TIME PARTIAL: CPT

## 2021-01-01 PROCEDURE — 83605 ASSAY OF LACTIC ACID: CPT

## 2021-01-01 PROCEDURE — 85025 COMPLETE CBC W/AUTO DIFF WBC: CPT

## 2021-01-01 PROCEDURE — 1123F ACP DISCUSS/DSCN MKR DOCD: CPT | Performed by: FAMILY MEDICINE

## 2021-01-01 PROCEDURE — 70553 MRI BRAIN STEM W/O & W/DYE: CPT

## 2021-01-01 PROCEDURE — 36415 COLL VENOUS BLD VENIPUNCTURE: CPT

## 2021-01-01 PROCEDURE — 85610 PROTHROMBIN TIME: CPT

## 2021-01-01 PROCEDURE — 87635 SARS-COV-2 COVID-19 AMP PRB: CPT

## 2021-01-01 PROCEDURE — 84145 PROCALCITONIN (PCT): CPT

## 2021-01-01 PROCEDURE — 84484 ASSAY OF TROPONIN QUANT: CPT

## 2021-01-01 PROCEDURE — 71046 X-RAY EXAM CHEST 2 VIEWS: CPT

## 2021-01-01 PROCEDURE — 4040F PNEUMOC VAC/ADMIN/RCVD: CPT | Performed by: FAMILY MEDICINE

## 2021-01-01 PROCEDURE — 6360000004 HC RX CONTRAST MEDICATION: Performed by: NEUROLOGICAL SURGERY

## 2021-01-01 PROCEDURE — 82550 ASSAY OF CK (CPK): CPT

## 2021-01-01 PROCEDURE — 80053 COMPREHEN METABOLIC PANEL: CPT

## 2021-01-01 PROCEDURE — G8427 DOCREV CUR MEDS BY ELIG CLIN: HCPCS | Performed by: FAMILY MEDICINE

## 2021-01-01 PROCEDURE — 99213 OFFICE O/P EST LOW 20 MIN: CPT | Performed by: FAMILY MEDICINE

## 2021-01-01 PROCEDURE — 81003 URINALYSIS AUTO W/O SCOPE: CPT

## 2021-01-01 PROCEDURE — 99442 PR PHYS/QHP TELEPHONE EVALUATION 11-20 MIN: CPT | Performed by: FAMILY MEDICINE

## 2021-01-01 PROCEDURE — A9579 GAD-BASE MR CONTRAST NOS,1ML: HCPCS | Performed by: NEUROLOGICAL SURGERY

## 2021-01-01 PROCEDURE — 80177 DRUG SCRN QUAN LEVETIRACETAM: CPT

## 2021-01-01 RX ORDER — TAMSULOSIN HYDROCHLORIDE 0.4 MG/1
CAPSULE ORAL
Qty: 90 CAPSULE | Refills: 3 | Status: SHIPPED | OUTPATIENT
Start: 2021-01-01

## 2021-01-01 RX ORDER — LEVETIRACETAM 500 MG/1
TABLET ORAL
Qty: 60 TABLET | Refills: 2 | Status: SHIPPED | OUTPATIENT
Start: 2021-01-01

## 2021-01-01 RX ORDER — CIPROFLOXACIN 500 MG/1
500 TABLET, FILM COATED ORAL 2 TIMES DAILY
Qty: 14 TABLET | Refills: 0 | Status: SHIPPED | OUTPATIENT
Start: 2021-01-01 | End: 2021-01-01

## 2021-01-01 RX ORDER — LEVETIRACETAM 500 MG/1
500 TABLET ORAL 2 TIMES DAILY
Qty: 60 TABLET | Refills: 0 | Status: SHIPPED | OUTPATIENT
Start: 2021-01-01 | End: 2021-01-01

## 2021-01-01 RX ORDER — LEVETIRACETAM 500 MG/1
TABLET ORAL
Qty: 60 TABLET | Refills: 0 | Status: SHIPPED | OUTPATIENT
Start: 2021-01-01 | End: 2021-01-01 | Stop reason: SDUPTHER

## 2021-01-01 RX ADMIN — GADOTERIDOL 15 ML: 279.3 INJECTION, SOLUTION INTRAVENOUS at 12:14

## 2021-01-01 ASSESSMENT — ENCOUNTER SYMPTOMS
ALLERGIC/IMMUNOLOGIC NEGATIVE: 1
GASTROINTESTINAL NEGATIVE: 1
EYES NEGATIVE: 1
DIARRHEA: 0
TROUBLE SWALLOWING: 0
SHORTNESS OF BREATH: 0
RESPIRATORY NEGATIVE: 1
SINUS PRESSURE: 0
COUGH: 0
CHEST TIGHTNESS: 0
SHORTNESS OF BREATH: 0
ABDOMINAL PAIN: 0
BACK PAIN: 0
VOMITING: 0

## 2021-01-01 ASSESSMENT — PATIENT HEALTH QUESTIONNAIRE - PHQ9
SUM OF ALL RESPONSES TO PHQ QUESTIONS 1-9: 1
SUM OF ALL RESPONSES TO PHQ9 QUESTIONS 1 & 2: 1
SUM OF ALL RESPONSES TO PHQ QUESTIONS 1-9: 1

## 2021-02-02 NOTE — PROGRESS NOTES
Patient is seen in follow up for   Chief Complaint   Patient presents with    Headache     HPIhe has questions about recent brain mass considering a biopsy. Past Medical History:   Diagnosis Date    Cerebral artery occlusion with cerebral infarction [facility])      Patient Active Problem List    Diagnosis Date Noted    [facility] [address]     Peripheral facial palsy     Breakthrough seizure ([facility] [address] [facility]     Brain mass 11/09/2020    TIA (transient ischemic attack) 11/09/2020    BPH (benign prostatic hyperplasia) 04/26/2013     Past Surgical History:   Procedure Laterality Date    COLONOSCOPY      EYE SURGERY       Family History   Problem Relation Age of Onset    Heart Disease Father      Social History     Socioeconomic History    Marital status:      Spouse name: Not on file    Number of children: Not on file    Years of education: Not on file    Highest education level: Not on file   Occupational History    Not on file   Social Needs    Financial resource strain: Not on file    Food insecurity     Worry: Not on file     Inability: Not on file    Transportation needs     Medical: Not on file     Non-medical: Not on file   Tobacco Use    Smoking status: Never Smoker    Smokeless tobacco: Never Used   Substance and Sexual Activity    Alcohol use:  Yes     Alcohol/week: 1.0 standard drinks     Types: 1 Glasses of wine per week    Drug use: No    Sexual activity: Yes   Lifestyle    Physical activity     Days per week: Not on file     Minutes per session: Not on file    Stress: Not on file   Relationships    Social connections     Talks on phone: Not on file     Gets together: Not on file     Attends Quaker service: Not on file     Active member of club or organization: Not on file     Attends meetings of clubs or organizations: Not on file     Relationship status: Not on file    Intimate partner violence     Fear of current or ex partner: Not on file  Hepatitis B vaccine  Aged Out    Hib vaccine  Aged Out    Meningococcal (ACWY) vaccine  Aged Out       Review of Systems     Review of Systems   Constitutional: Negative for activity change, appetite change, chills, fever and unexpected weight change. HENT: Negative. Eyes: Negative. Respiratory: Negative. Negative for shortness of breath. Cardiovascular: Negative. Negative for chest pain and palpitations. Gastrointestinal: Negative. Endocrine: Negative. Genitourinary: Negative. Musculoskeletal: Negative. Skin: Negative. Allergic/Immunologic: Negative. Neurological: Negative. Hematological: Negative. Psychiatric/Behavioral: Negative. Physical Exam  There were no vitals filed for this visit. Physical Exam    Assessment   Diagnosis Orders   1. Brain mass       Problem List     Brain mass - Primary          Plan  Follow up with Dr. Heriberto Menard. No orders of the defined types were placed in this encounter. No follow-ups on file.   Hina Polanco MD

## 2021-03-01 NOTE — TELEPHONE ENCOUNTER
Pt's wife calling in requesting help from Dr Cecille De Dios for starting [facility] for her  stating she needs immediate help with him. I asked if I could made her an apt and PT's wife declined stating that they just had a VV with  3 wks ago. Pt's wife also requesting a phone call when written order is ready so that she can call a Home Care Service and get these services in place asap. Please advise.

## 2021-03-08 NOTE — TELEPHONE ENCOUNTER
Delio Vigil is requesting medication refill. Wife confirmed the pharmacy. Rx requested:  Requested Prescriptions     Pending Prescriptions Disp Refills    levETIRAcetam (KEPPRA) 500 MG tablet 60 tablet 0     Sig: Take 1 tablet by mouth 2 times daily       Last Office Visit:   2/2/2021    Next Visit Date:  No future appointments.

## 2021-03-15 PROBLEM — D43.2 NEOPLASM OF UNCERTAIN BEHAVIOR OF BRAIN AND SPINAL CORD (HCC): Status: ACTIVE | Noted: 2021-01-01

## 2021-03-15 PROBLEM — D43.4 NEOPLASM OF UNCERTAIN BEHAVIOR OF BRAIN AND SPINAL CORD (HCC): Status: ACTIVE | Noted: 2021-01-01

## 2021-03-15 NOTE — PROGRESS NOTES
Patient is seen in follow up for   Chief Complaint   Patient presents with    Hand Pain     Left hand hurting, worse at night time. Sharp shooting pains. Present for 2 weeks. This is the hand that he cannot do much with after his seizure.  Urinary Frequency     Urinating a lot throughout the night. Ongoing for about 2 weeks. No pain with urination. Only urinating a tiny amount at once. HPIhe has had increase in urinary frequency. He has a brain tumor and is having right hand pain. 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 ([facility] [address] 03/15/2021    Status epilepticus ([facility] [address]     Peripheral facial palsy     Breakthrough seizure ([facility] [address]     Brain mass 11/09/2020    TIA (transient ischemic attack) 11/09/2020    BPH (benign prostatic hyperplasia) 04/26/2013     Past Surgical History:   Procedure Laterality Date    COLONOSCOPY      EYE SURGERY       Family History   Problem Relation Age of Onset    Heart Disease Father      Social History     Socioeconomic History    Marital status:      Spouse name: Not on file    Number of children: Not on file    Years of education: Not on file    Highest education level: Not on file   Occupational History    Not on file   Social Needs    Financial resource strain: Not on file    Food insecurity     Worry: Not on file     Inability: Not on file    Transportation needs     Medical: Not on file     Non-medical: Not on file   Tobacco Use    Smoking status: Never Smoker    Smokeless tobacco: Never Used   Substance and Sexual Activity    Alcohol use:  Yes     Alcohol/week: 1.0 standard drinks     Types: 1 Glasses of wine per week    Drug use: No    Sexual activity: Yes   Lifestyle    Physical activity     Days per week: Not on file     Minutes per session: Not on file    Stress: Not on file   Relationships    Social connections     Talks on phone: Not on file     Gets together: Not on file     Attends [employer] service: Not on file     Active member of club or organization: Not on file     Attends meetings of clubs or organizations: Not on file     Relationship status: Not on file    Intimate partner violence     Fear of current or ex partner: Not on file     Emotionally abused: Not on file     Physically abused: Not on file     Forced sexual activity: Not on file   Other Topics Concern    Not on file   Social History Narrative    Not on file     Current Outpatient Medications   Medication Sig Dispense Refill    ciprofloxacin (CIPRO) 500 MG tablet Take 1 tablet by mouth 2 times daily for 7 days 14 tablet 0    levETIRAcetam (KEPPRA) 500 MG tablet Take 1 tablet by mouth 2 times daily 60 tablet 0    tamsulosin (FLOMAX) 0.4 MG capsule TAKE ONE CAPSULE BY MOUTH DAILY 90 capsule 3    melatonin 5 MG TABS tablet Take 5 mg by mouth daily      finasteride (PROSCAR) 5 MG tablet Take 1 tablet by mouth daily 90 tablet 3    timolol (BETIMOL) 0.5 % ophthalmic solution 1 drop 2 times daily      latanoprost (XALATAN) 0.005 % ophthalmic solution 1 drop nightly      Multiple Vitamin (ONE-A-DAY MENS PO) Take by mouth      Lutein 20 MG CAPS Take by mouth      Omega 3 1200 MG CAPS Take by mouth       No current facility-administered medications for this visit.       Current Outpatient Medications on File Prior to Visit   Medication Sig Dispense Refill    levETIRAcetam (KEPPRA) 500 MG tablet Take 1 tablet by mouth 2 times daily 60 tablet 0    tamsulosin (FLOMAX) 0.4 MG capsule TAKE ONE CAPSULE BY MOUTH DAILY 90 capsule 3    melatonin 5 MG TABS tablet Take 5 mg by mouth daily      finasteride (PROSCAR) 5 MG tablet Take 1 tablet by mouth daily 90 tablet 3    timolol (BETIMOL) 0.5 % ophthalmic solution 1 drop 2 times daily      latanoprost (XALATAN) 0.005 % ophthalmic solution 1 drop nightly      Multiple Vitamin (ONE-A-DAY MENS PO) Take by mouth      Lutein 20 MG CAPS Take by mouth      Omega 3 1200 MG CAPS Take by mouth      [DISCONTINUED] tamsulosin (FLOMAX) 0.4 MG capsule TAKE 1 CAPSULE BY MOUTH DAILY 90 capsule 3     No current facility-administered medications on file prior to visit. No Known Allergies  Health Maintenance   Topic Date Due    COVID-19 Vaccine (1) Never done    DTaP/Tdap/Td vaccine (1 - Tdap) Never done    Shingles Vaccine (2 of 3) 07/24/2012    Annual Wellness Visit (AWV)  11/18/2021    Flu vaccine  Completed    Pneumococcal 65+ years Vaccine  Completed    Hepatitis A vaccine  Aged Out    Hepatitis B vaccine  Aged Out    Hib vaccine  Aged Out    Meningococcal (ACWY) vaccine  Aged Out       Review of Systems     Review of Systems    Physical Exam  There were no vitals filed for this visit. Physical Exam    Assessment   Diagnosis Orders   1. Brain mass     2. Intractable epilepsy without status epilepticus, unspecified epilepsy type ([facility] [address]     3. 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   Veronica Stephenson MD

## 2021-03-24 NOTE — ED NOTES
Bed: 19  Expected date:   Expected time:   Means of arrival:   Comments:  80 male \"eval for Nurising home\", SR69, 96% room air, 155/88

## 2021-03-24 NOTE — TELEPHONE ENCOUNTER
If he is getting worse I would take him to the er where he can be evaluated and arrange for nursing home versus hospice treatment.

## 2021-03-24 NOTE — TELEPHONE ENCOUNTER
Pt family is calling requesting guidance as to how they can get PT admitted to a long term care facility. Pt's wife can no longer adequately care for PT due to PT being too hard to handle for wife. Family would prefer to get PT admitted to [facility], but they are open to anything. Family also requesting to have PT admitted to the hospital immediately so that they can get him out of the home due to worry of Pt's wife now. Wife is requesting a phone call as soon as possible from  for guidance.     [phone]

## 2021-03-25 NOTE — CARE COORDINATION
Pt states interest in getting some rehab and care at a nursing home. I provided him and his family with a list of nursing homes and encouraged them to choose 3, call them for admission and consider going and visiting them. I gave them information on hospices and palliative care and informed them that most hospices have palliative care. I gave them information on [facility] agencies and informed them that most companies offer PT/OT if they would like that. I instructed the patient to contact his Dr regarding his needs so that orders can be facilitated. He states interest in getting a BSC, Dr Rudy Adame sent an E-script to [employer] [facility] and I instructed them to go to [facility] in [place] and they can assist them with obtaining one. Pt has bilateral upper and lower extremity weakness due to a brain tumor and is unable to get in and out of the car and into his home. We arranged for an ambulance to come to transport him home. Pt is provided with food and fluids.

## 2021-03-25 NOTE — ED NOTES
Pt and pt's wife state that pt was diagnosed with seizures and discovered a tumor in December. Pt has lost all function of his left side. Pt and wife state that he has become too weak to be able to assist his wife in his own care. She states that she can not help in to and out of the bathroom, shower or other self care routines. Pt's left leg and left arm are flaccid. Pt is alert and oriented at this time.      Jacque Wang RN  03/24/21 2004

## 2021-03-29 NOTE — TELEPHONE ENCOUNTER
Please send order over to Hospice care fax. She is also requesting med list and demographics. Please advise. If you need any information. [facility] is the  for the patient's hospice.  He number is [phone]

## 2021-03-29 NOTE — TELEPHONE ENCOUNTER
Not having any luck with Palliative Care. Pt's son is requesting that this referral be changed to [address] [place] [address]. Once reading through paperwork concerning Palliative Care, family is stating that they need more help than Palliative.       Please fax referral to   [phone]      Hospice is set to meet with family today at 11am.

## 2021-04-03 NOTE — ED PROVIDER NOTES
[facility] ED  eMERGENCY dEPARTMENT eNCOUnter      Pt Name: David Chpaarro  MRN: [id number]  Cristiangfmilagro 10/9/1930  Date of evaluation: 3/24/2021  Provider: Garo Corcoran DO    CHIEF COMPLAINT       Chief Complaint   Patient presents with    Other     family wants eval for nursing home         HISTORY OF PRESENT ILLNESS   (Location/Symptom, Timing/Onset,Context/Setting, Quality, Duration, Modifying Factors, Severity)  Note limiting factors. David Chaparro is a 80 y.o. male who presents to the emergency department with complaints of wanting a nursing home evaluation. Patient has a brain tumor and this was discovered during a seizure work-up. Patient has chronic weakness to both extremities. He is able to stand some but he has difficulty even with taking a few steps for ambulation. There is no report of any fever, chills, vomiting or diarrhea. Patient has episodes of confusion which are typical for him. The patient is not in any palliative care and he is also not in hospice. The family came into the ER after 5 PM and the ER physician explained that he would not be able to get the PT OT eval that the patient would need. The family was provided with a list of nursing homes and was instructed by the care coordinator that they could arrange some visits to them as well as possible direct admissions to them during daytime hours. The history is provided by the patient. NursingNotes were reviewed. REVIEW OF SYSTEMS    (2-9 systems for level 4, 10 or more for level 5)     Review of Systems   Constitutional: Negative for activity change, appetite change, chills, fever and unexpected weight change. HENT: Negative for drooling, ear pain, nosebleeds, sinus pressure and trouble swallowing. Respiratory: Negative for cough, chest tightness and shortness of breath. Cardiovascular: Negative for chest pain and leg swelling. Gastrointestinal: Negative for abdominal pain, diarrhea and vomiting. Endocrine: Negative for polydipsia and polyphagia. Genitourinary: Negative for dysuria, flank pain and frequency. Musculoskeletal: Negative for back pain and myalgias. Skin: Negative for pallor and rash. Neurological: Negative for syncope, weakness and headaches. Hematological: Does not bruise/bleed easily. All other systems reviewed and are negative. Except as noted above the remainder of the review of systems was reviewed and negative. PAST MEDICAL HISTORY     Past Medical History:   Diagnosis Date    Cerebral artery occlusion with cerebral infarction ([facility] [address]          SURGICALHISTORY       Past Surgical History:   Procedure Laterality Date    COLONOSCOPY      EYE SURGERY           CURRENT MEDICATIONS       Discharge Medication List as of 3/24/2021  8:37 PM      CONTINUE these medications which have NOT CHANGED    Details   levETIRAcetam (KEPPRA) 500 MG tablet Take 1 tablet by mouth 2 times daily, Disp-60 tablet, R-0Normal      tamsulosin (FLOMAX) 0.4 MG capsule TAKE ONE CAPSULE BY MOUTH DAILY, Disp-90 capsule, R-3Normal      melatonin 5 MG TABS tablet Take 5 mg by mouth dailyHistorical Med      finasteride (PROSCAR) 5 MG tablet Take 1 tablet by mouth daily, Disp-90 tablet,R-3Normal      timolol (BETIMOL) 0.5 % ophthalmic solution 1 drop 2 times dailyHistorical Med      latanoprost (XALATAN) 0.005 % ophthalmic solution 1 drop nightlyHistorical Med      Multiple Vitamin (ONE-A-DAY MENS PO) Take by mouthHistorical Med      Lutein 20 MG CAPS Take by mouthHistorical Med      Omega 3 1200 MG CAPS Take by mouthHistorical Med             ALLERGIES     Patient has no known allergies.     FAMILY HISTORY       Family History   Problem Relation Age of Onset    Heart Disease Father           SOCIAL HISTORY       Social History     Socioeconomic History    Marital status:      Spouse name: None    Number of children: None    Years of education: None    Highest education level: None and symmetric. Psychiatric:         Mood and Affect: Mood normal.         Behavior: Behavior normal. Behavior is cooperative. Thought Content: Thought content normal.         Judgment: Judgment normal.         DIAGNOSTIC RESULTS     EKG: All EKG's are interpreted by the Emergency Department Physician who either signs or Co-signsthis chart in the absence of a cardiologist.        RADIOLOGY:   Rollene Saver such as CT, Ultrasound and MRI are read by the radiologist. Plain radiographic images are visualized and preliminarily interpreted by the emergency physician with the below findings:        Interpretation per the Radiologist below, if available at the time ofthis note:    XR CHEST (2 VW)   Final Result   NO ACUTE CARDIOPULMONARY DISEASE            ED BEDSIDE ULTRASOUND:   Performed by ED Physician - none    LABS:  Labs Reviewed   CBC WITH AUTO DIFFERENTIAL - Abnormal; Notable for the following components:       Result Value    MCHC 32.3 (*)     All other components within normal limits   COMPREHENSIVE METABOLIC PANEL - Abnormal; Notable for the following components:    Glucose 116 (*)     Alkaline Phosphatase 138 (*)     ALT 44 (*)     All other components within normal limits   COVID-19, RAPID   PROTIME-INR   APTT   BRAIN NATRIURETIC PEPTIDE   TROPONIN   CK   URINE RT REFLEX TO CULTURE   LACTIC ACID, PLASMA   PROCALCITONIN   LEVETIRACETAM LEVEL       All other labs were within normal range or not returned as of this dictation. EMERGENCY DEPARTMENT COURSE and DIFFERENTIAL DIAGNOSIS/MDM:   Vitals:    Vitals:    03/24/21 1732   BP: (!) 152/79   Pulse: 69   Resp: 16   Temp: 98.3 °F (36.8 °C)   TempSrc: Oral   SpO2: 93%   Weight: 172 lb (78 kg)           MDM  ED attending made a palliative care referral and the patient should be contacted tomorrow. Patient is medically well was transported home via ambulance transport. Family has the resources they feel comfortable with this plan.       CONSULTS: None    PROCEDURES:  Unless otherwise noted below, none     Procedures    FINAL IMPRESSION      1. Generalized weakness    2. Decreased activities of daily living (ADL)    3.  History of brain tumor          DISPOSITION/PLAN   DISPOSITION Decision To Discharge 03/24/2021 08:23:27 PM      PATIENT REFERRED TO:  Arlette Kumar MD  [id number] [phone]    Call in 1 day      [address] [place] [address]  [phone]  Call in 1 day        DISCHARGE MEDICATIONS:  Discharge Medication List as of 3/24/2021  8:37 PM             (Please note that portions of this note were completed with a voice recognition program.  Efforts were made to edit the dictations but occasionally words are mis-transcribed.)    Rolando Moran DO (electronically signed)  Attending Emergency Physician          Rolando Moran DO  04/02/21 [id number]

## 2021-07-26 ENCOUNTER — TELEPHONE (OUTPATIENT)
Dept: FAMILY MEDICINE CLINIC | Age: 86
End: 2021-07-26

## 2021-07-26 NOTE — TELEPHONE ENCOUNTER
Noel from [facility] called. Mr. Zachery Tam passed on 2021. The  was told by Hospice that Dr. Gamaliel Poon would be signing the Death Certificate.     [facility] [phone]

## 2022-02-15 ENCOUNTER — TELEPHONE (OUTPATIENT)
Dept: FAMILY MEDICINE CLINIC | Age: 87
End: 2022-02-15